# Patient Record
Sex: MALE | Race: WHITE
[De-identification: names, ages, dates, MRNs, and addresses within clinical notes are randomized per-mention and may not be internally consistent; named-entity substitution may affect disease eponyms.]

---

## 2018-06-18 ENCOUNTER — TRANSCRIPTION ENCOUNTER (OUTPATIENT)
Age: 48
End: 2018-06-18

## 2019-05-23 ENCOUNTER — APPOINTMENT (OUTPATIENT)
Dept: INTERNAL MEDICINE | Facility: CLINIC | Age: 49
End: 2019-05-23
Payer: COMMERCIAL

## 2019-05-23 VITALS
BODY MASS INDEX: 23.49 KG/M2 | TEMPERATURE: 98.3 F | HEIGHT: 65 IN | WEIGHT: 141 LBS | SYSTOLIC BLOOD PRESSURE: 126 MMHG | HEART RATE: 88 BPM | DIASTOLIC BLOOD PRESSURE: 80 MMHG

## 2019-05-23 DIAGNOSIS — Z00.00 ENCOUNTER FOR GENERAL ADULT MEDICAL EXAMINATION W/OUT ABNORMAL FINDINGS: ICD-10-CM

## 2019-05-23 DIAGNOSIS — F41.9 ANXIETY DISORDER, UNSPECIFIED: ICD-10-CM

## 2019-05-23 DIAGNOSIS — G47.00 INSOMNIA, UNSPECIFIED: ICD-10-CM

## 2019-05-23 DIAGNOSIS — R19.7 DIARRHEA, UNSPECIFIED: ICD-10-CM

## 2019-05-23 PROCEDURE — 36415 COLL VENOUS BLD VENIPUNCTURE: CPT

## 2019-05-23 PROCEDURE — 99213 OFFICE O/P EST LOW 20 MIN: CPT | Mod: 25

## 2019-05-23 RX ORDER — OMEPRAZOLE 40 MG/1
40 CAPSULE, DELAYED RELEASE ORAL
Qty: 90 | Refills: 0 | Status: ACTIVE | COMMUNITY
Start: 2018-12-05

## 2019-05-23 NOTE — HEALTH RISK ASSESSMENT
[Patient reported colonoscopy was normal] : Patient reported colonoscopy was normal [ColonoscopyDate] : 11/17 [ColonoscopyComments] : patrick

## 2019-05-23 NOTE — HISTORY OF PRESENT ILLNESS
[FreeTextEntry1] : insomnia--increased anxiety, loose stool [de-identified] : multiple somatic complaints--anxiety--poor sleep, loose bowels once day

## 2019-05-24 LAB
ALBUMIN SERPL ELPH-MCNC: 4.5 G/DL
ALP BLD-CCNC: 66 U/L
ALT SERPL-CCNC: 17 U/L
ANION GAP SERPL CALC-SCNC: 14 MMOL/L
AST SERPL-CCNC: 15 U/L
BASOPHILS # BLD AUTO: 0.05 K/UL
BASOPHILS NFR BLD AUTO: 0.6 %
BILIRUB SERPL-MCNC: 0.5 MG/DL
BUN SERPL-MCNC: 22 MG/DL
CALCIUM SERPL-MCNC: 9.8 MG/DL
CHLORIDE SERPL-SCNC: 103 MMOL/L
CO2 SERPL-SCNC: 28 MMOL/L
CREAT SERPL-MCNC: 1 MG/DL
EOSINOPHIL # BLD AUTO: 0.06 K/UL
EOSINOPHIL NFR BLD AUTO: 0.7 %
GLUCOSE SERPL-MCNC: 91 MG/DL
HCT VFR BLD CALC: 44.2 %
HGB BLD-MCNC: 14.3 G/DL
IMM GRANULOCYTES NFR BLD AUTO: 0.2 %
LYMPHOCYTES # BLD AUTO: 2.12 K/UL
LYMPHOCYTES NFR BLD AUTO: 25.2 %
MAN DIFF?: NORMAL
MCHC RBC-ENTMCNC: 29.5 PG
MCHC RBC-ENTMCNC: 32.4 GM/DL
MCV RBC AUTO: 91.3 FL
MONOCYTES # BLD AUTO: 0.48 K/UL
MONOCYTES NFR BLD AUTO: 5.7 %
NEUTROPHILS # BLD AUTO: 5.67 K/UL
NEUTROPHILS NFR BLD AUTO: 67.6 %
PLATELET # BLD AUTO: 345 K/UL
POTASSIUM SERPL-SCNC: 4.8 MMOL/L
PROT SERPL-MCNC: 6.7 G/DL
RBC # BLD: 4.84 M/UL
RBC # FLD: 12.1 %
SODIUM SERPL-SCNC: 145 MMOL/L
WBC # FLD AUTO: 8.4 K/UL

## 2020-10-01 ENCOUNTER — RECORD ABSTRACTING (OUTPATIENT)
Age: 50
End: 2020-10-01

## 2020-10-01 DIAGNOSIS — K12.1 OTHER FORMS OF STOMATITIS: ICD-10-CM

## 2020-10-01 DIAGNOSIS — Z86.69 PERSONAL HISTORY OF OTHER DISEASES OF THE NERVOUS SYSTEM AND SENSE ORGANS: ICD-10-CM

## 2020-10-01 DIAGNOSIS — R42 DIZZINESS AND GIDDINESS: ICD-10-CM

## 2020-10-01 DIAGNOSIS — H83.3X9 NOISE EFFECTS ON INNER EAR, UNSPECIFIED EAR: ICD-10-CM

## 2020-10-14 ENCOUNTER — APPOINTMENT (OUTPATIENT)
Dept: OTOLARYNGOLOGY | Facility: CLINIC | Age: 50
End: 2020-10-14
Payer: COMMERCIAL

## 2020-10-14 VITALS
TEMPERATURE: 97.4 F | HEART RATE: 65 BPM | HEIGHT: 65 IN | BODY MASS INDEX: 23.66 KG/M2 | SYSTOLIC BLOOD PRESSURE: 113 MMHG | WEIGHT: 142 LBS | DIASTOLIC BLOOD PRESSURE: 79 MMHG

## 2020-10-14 DIAGNOSIS — H61.23 IMPACTED CERUMEN, BILATERAL: ICD-10-CM

## 2020-10-14 DIAGNOSIS — K21.9 GASTRO-ESOPHAGEAL REFLUX DISEASE W/OUT ESOPHAGITIS: ICD-10-CM

## 2020-10-14 PROCEDURE — 92557 COMPREHENSIVE HEARING TEST: CPT

## 2020-10-14 PROCEDURE — 99213 OFFICE O/P EST LOW 20 MIN: CPT | Mod: 25

## 2020-10-14 PROCEDURE — 92567 TYMPANOMETRY: CPT

## 2020-10-14 RX ORDER — MV-MN/IRON/FA/K1/RESV/LUT/HERB 18 MG-240
TABLET ORAL
Refills: 0 | Status: ACTIVE | COMMUNITY

## 2020-10-14 NOTE — PHYSICAL EXAM
[Hearing Diamond Test (Tuning Fork On Forehead)] : no lateralization of tone [Normal] : orientation to person, place, and time: normal [FreeTextEntry8] : cerumen impaction removed via curettage [FreeTextEntry9] : cerumen impaction removed via curettage [FreeTextEntry2] : sinuses nontender to percussion [FreeTextEntry1] : uvula edematous [de-identified] : sensations intact

## 2020-10-14 NOTE — CONSULT LETTER
[Dear  ___] : Dear  [unfilled], [Courtesy Letter:] : I had the pleasure of seeing your patient, [unfilled], in my office today. [Please see my note below.] : Please see my note below. [Consult Closing:] : Thank you very much for allowing me to participate in the care of this patient.  If you have any questions, please do not hesitate to contact me. [Sincerely,] : Sincerely, [FreeTextEntry3] : Mendez Bazan MD FACS

## 2020-10-14 NOTE — ASSESSMENT
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 Audio. FU 6 months.

## 2020-10-14 NOTE — HISTORY OF PRESENT ILLNESS
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 Pt presents today for cerumen removal. Denies any change in hearing or tinnitus. He states that the left ear was bothering him 1 week ago but attributed it to the cerumen.

## 2020-10-14 NOTE — ADDENDUM
[FreeTextEntry1] : Documented by April Benitez acting as scribe for Dr. Bazan on 10/14/2020.\par \par All Medical record entries made by the Scribe were at my, Dr. Bazan, direction and personally dictated by me on 10/14/2020 . I have reviewed the chart and agree that the record accurately reflects my personal performance of the history, physical exam, assessment and plan. I have also personally directed, reviewed, and agreed with the discharge instructions.

## 2021-03-29 ENCOUNTER — APPOINTMENT (OUTPATIENT)
Dept: OTOLARYNGOLOGY | Facility: CLINIC | Age: 51
End: 2021-03-29
Payer: COMMERCIAL

## 2021-03-29 VITALS
DIASTOLIC BLOOD PRESSURE: 83 MMHG | HEART RATE: 69 BPM | WEIGHT: 140 LBS | SYSTOLIC BLOOD PRESSURE: 127 MMHG | HEIGHT: 65 IN | TEMPERATURE: 97.9 F | BODY MASS INDEX: 23.32 KG/M2

## 2021-03-29 DIAGNOSIS — H61.21 IMPACTED CERUMEN, RIGHT EAR: ICD-10-CM

## 2021-03-29 PROCEDURE — 99072 ADDL SUPL MATRL&STAF TM PHE: CPT

## 2021-03-29 PROCEDURE — 99213 OFFICE O/P EST LOW 20 MIN: CPT | Mod: 25

## 2021-03-29 PROCEDURE — 69210 REMOVE IMPACTED EAR WAX UNI: CPT

## 2021-03-29 NOTE — PHYSICAL EXAM
[Midline] : trachea is located in midline position [Clear / Open well] : hypopharynx is clear and opens well [Normal] : no rashes [FreeTextEntry8] : cerumen impaction removed via curettage  [FreeTextEntry9] : cerumen removed via curettage  [FreeTextEntry1] : elongated uvula [FreeTextEntry2] : sinuses nontender to percussion

## 2021-03-29 NOTE — HISTORY OF PRESENT ILLNESS
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 Pt presents today for cerumen removal. Denies a clogging sensation.

## 2021-03-29 NOTE — ASSESSMENT
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 FU 6 months.

## 2021-03-29 NOTE — ADDENDUM
[FreeTextEntry1] : Documented by April Benitez acting as scribe for Dr. Bazan on 03/29/2021.\par \par All Medical record entries made by the Scribe were at my, Dr. Bazan, direction and personally dictated by me on 03/29/2021 . I have reviewed the chart and agree that the record accurately reflects my personal performance of the history, physical exam, assessment and plan. I have also personally directed, reviewed, and agreed with the discharge instructions.

## 2021-08-17 ENCOUNTER — APPOINTMENT (OUTPATIENT)
Dept: OTOLARYNGOLOGY | Facility: CLINIC | Age: 51
End: 2021-08-17
Payer: COMMERCIAL

## 2021-08-17 VITALS
HEART RATE: 74 BPM | HEIGHT: 65 IN | BODY MASS INDEX: 23.32 KG/M2 | DIASTOLIC BLOOD PRESSURE: 92 MMHG | WEIGHT: 140 LBS | SYSTOLIC BLOOD PRESSURE: 129 MMHG

## 2021-08-17 DIAGNOSIS — H90.3 SENSORINEURAL HEARING LOSS, BILATERAL: ICD-10-CM

## 2021-08-17 PROCEDURE — 92557 COMPREHENSIVE HEARING TEST: CPT

## 2021-08-17 PROCEDURE — 92567 TYMPANOMETRY: CPT

## 2021-08-17 PROCEDURE — 99213 OFFICE O/P EST LOW 20 MIN: CPT

## 2021-08-17 NOTE — ADDENDUM
[FreeTextEntry1] : Documented by Frederic Liu acting as a scribe for Dr. Mendez Bazan on (08/17/2021).\par \par All medical record entries made by the Scribe were at my, Dr. Mendez Bazan's, direction and personally dictated by me on (08/17/2021). I have reviewed the chart and agree that the record accurately reflects my personal performance of the history, physical exam, assessment and plan. I have also personally directed, reviewed, and agree with the discharge instructions.\par \par

## 2021-08-17 NOTE — CONSULT LETTER
[Dear  ___] : Dear  [unfilled], [Courtesy Letter:] : I had the pleasure of seeing your patient, [unfilled], in my office today. [Please see my note below.] : Please see my note below. [Consult Closing:] : Thank you very much for allowing me to participate in the care of this patient.  If you have any questions, please do not hesitate to contact me. [Sincerely,] : Sincerely, [FreeTextEntry3] : Dr. Mendez Bazan MD FACS
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
## 2021-08-17 NOTE — DATA REVIEWED
[de-identified] : Tymps: Type A, AU\par Results obtained via insert earphones revealed:\par Right: hearing WNL with a mild notched SNHL 3-4khz\par Left: Hearing WNL with a mild notched SNHL 2-4khz \par *Slight Asymmetry noted at 2khz\par Recs: 1) Ent f/u 2) re-eval as per MD

## 2021-08-17 NOTE — ASSESSMENT
[FreeTextEntry1] : Reviewed and reconciled medications, allergies, PMHx, PSHx, SocHx, FMHx. 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 Right: hearing WNL with a mild notched SNHL 3-4khz. Left: Hearing WNL with a mild notched SNHL 2-4khz . *Slight Asymmetry noted at 2khz. No change from audio 10/2020\par \par Plan:\par Cerumen Removed. Audio - results interpreted by Dr. Bazan and reviewed with the patient. FU 6 months.

## 2021-08-17 NOTE — PHYSICAL EXAM
[Hearing Diamond Test (Tuning Fork On Forehead)] : no lateralization of tone [Normal] : lingual tonsils are normal [Midline] : trachea located in midline position [FreeTextEntry8] : Cerumen removed via curettage  [FreeTextEntry9] : Cerumen removed via curettage  [FreeTextEntry1] : deviated septum along floor- right greater than left [de-identified] : mild uvula edema [FreeTextEntry2] : Sinuses nontender to percussion.

## 2022-04-21 ENCOUNTER — APPOINTMENT (OUTPATIENT)
Dept: ORTHOPEDIC SURGERY | Facility: CLINIC | Age: 52
End: 2022-04-21
Payer: COMMERCIAL

## 2022-04-21 ENCOUNTER — APPOINTMENT (OUTPATIENT)
Dept: PEDIATRIC MEDICAL GENETICS | Facility: CLINIC | Age: 52
End: 2022-04-21
Payer: COMMERCIAL

## 2022-04-21 PROCEDURE — 96040: CPT | Mod: 95

## 2022-04-21 PROCEDURE — 73030 X-RAY EXAM OF SHOULDER: CPT | Mod: RT

## 2022-04-21 PROCEDURE — 99214 OFFICE O/P EST MOD 30 MIN: CPT

## 2022-04-23 NOTE — PHYSICAL EXAM
[Normal Coordination] : normal coordination [Normal DTR UE/LE] : normal DTR UE/LE  [Normal Sensation] : normal sensation [Normal Mood and Affect] : normal mood and affect [Orientated] : orientated [No Rash] : no rash [Normal Skin] : normal skin [No Ulcers] : no ulcers [No Lesions] : no lesions [No obvious lymphadenopathy in areas examined] : no obvious lymphadenopathy in areas examined [Right] : right shoulder [There are no fractures, subluxations or dislocations. No significant abnormalities are seen] : There are no fractures, subluxations or dislocations. No significant abnormalities are seen [Type 2 acromion] : Type 2 acromion [FreeTextEntry1] : OPAQUE FRAGMENTS IN THE DELTOID AND AXILLA

## 2022-04-23 NOTE — HISTORY OF PRESENT ILLNESS
[Right Arm] : right arm [Gradual] : gradual [8] : 8 [Sharp] : sharp [Stabbing] : stabbing [Exercising] : exercising [Intermittent] : intermittent [Lying in bed] : lying in bed [Full time] : Work status: full time [] : Post Surgical Visit: no [FreeTextEntry1] : right shoulder  [FreeTextEntry5] : Pt has had shoulder pain for about a month now. 

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
## 2022-06-02 ENCOUNTER — APPOINTMENT (OUTPATIENT)
Dept: ORTHOPEDIC SURGERY | Facility: CLINIC | Age: 52
End: 2022-06-02
Payer: COMMERCIAL

## 2022-06-02 VITALS — HEIGHT: 65 IN | BODY MASS INDEX: 23.32 KG/M2 | WEIGHT: 140 LBS

## 2022-06-02 PROCEDURE — 99214 OFFICE O/P EST MOD 30 MIN: CPT

## 2022-06-07 NOTE — DISCUSSION/SUMMARY
[de-identified] : Instructions: Dx / Natural History\par The patient was advised of the diagnosis.  The natural history of the pathology was explained in full to the patient in layman's terms.  Several different treatment options were discussed and explained in full to the patient including the risks and benefits of both surgical and non-surgical treatments.  All questions and concerns were answered. \par \par RICE\par I explained to the patient that rest, ice, compression, and elevation would benefit them.  They may return to activity after follow-up or when they no longer have any pain.\par \par NSAIDs - OTC\par Patient is to begin over the counter oral anti-inflammatory medications on an as needed basis, as long as there are no medical contraindications.  Patient is counseled on possible GI and blood pressure side effects.\par \par Pain Guide Activities\par The patient was advised to let pain guide the gradual advancement of activities.\par \par Icing\par The patient was advised to apply ice (wrapped in a towel or protective covering) to the area daily (20 minutes at a time, 2-4X/day).

## 2022-06-07 NOTE — PHYSICAL EXAM
[de-identified] : The patient is a well appearing 52 year year old male of their stated age.\par Neck is supple & nontender to palpation. Negative Spurling's test.\par \par Effected Shoulder \par Inspection:\par Scapula Winging: Negative\par Deformity: None\par Erythema: None\par Ecchymosis: None\par Abrasions: None\par Effusion: None\par \par Range of Motion:\par Active Forward Flexion: [160] degrees \par Passive Forward Flexion: [170] degrees \par Active IR : [l2] \par Passive ER : [45] degrees\par \par Motor Exam:\par Forward Flexion: 4+ out of 5\par Flexion Plane of Scapula: 5 out of 5\par Abduction: 4+ out of 5\par Internal Rotation: 5 out of 5\par External Rotation: 4+ out of 5\par Distal Motor Strength: 5 out of 5\par \par Stability Testing:\par Anterior: 1+\par Posterior: 1+\par Sulcus N: 1+\par Sulcus ER: 1+\par \par Provocative Tests:\par Drop Arm: Negative\par Blake/Impingement: Positive\par [place]: Positive\par X-Arm Adduction: Negative\par Belly Press: Negative\par Bear Hug: Negative\par Lift Off: Negative\par Apprehension: Negative\par Relocation: Negative\par Posterior Load & Shift: Negative\par \par Palpation:\par AC Joint: Nontender\par Clavicle: Nontender\par SC Joint: Nontender\par Bicepital Groove: Positive\par Coracoid Process: Nontender\par Pectoralis Minor Tendon: Nontender\par Pectoralis Major Tendon: Nontender & palpably intact\par Latissimus Dorsi: Nontender \par Proximal Humerus: Positive\par Scapula Body: Nontender\par Medial Scapula Boarder: Nontender\par Scapula Spine: Nontender\par \par Neurologic Exam: Sensation to Light Touch:\par Axillary: Grossly intact\par Ulnar: Grossly intact\par Radial: Grossly intact\par Median: Grossly intact\par Other: N/A\par \par Circulatory/Pulses:\par Ulnar: 2+\par Radial: 2+\par Other Pertinent Findings: None\par \par Contralateral Shoulder\par Range of Motion:\par Active Forward Flexion: 180 degrees \par Active Abduction: 180 degrees \par Passive Forward Flexion: 180 degrees \par Passive Abduction: 180 degrees \par ER @ 90 degrees: 90 degrees\par IR @ 90 degrees: 45 degrees\par ER @ 0 degrees: 50 degrees\par \par Motor Exam:\par Forward Flexion: 5 out of 5\par Flexion Plane of Scapula: 5 out of 5\par Abduction: 5 out of 5\par Internal Rotation: 5 out of 5\par External Rotation: 5 out of 5\par Distal Motor Strength: 5 out of 5\par \par Stability Testing:\par Anterior: 1+\par Posterior: 1+\par Sulcus N: 1+\par Sulcus ER: 1+\par \par Other Pertinent Findings: None

## 2022-06-07 NOTE — HISTORY OF PRESENT ILLNESS
[Right Arm] : right arm [Gradual] : gradual [8] : 8 [Sharp] : sharp [Stabbing] : stabbing [Intermittent] : intermittent [Exercising] : exercising [Lying in bed] : lying in bed [Full time] : Work status: full time [de-identified] : 06/02/2022 \par \isak CHANEL is presenting today for followup. Pain and symptoms are about the same. No real changes. He has been only doing stretching and exercises at home. He denies any numbness/tingling/fevers/chills.  [] : Post Surgical Visit: no [FreeTextEntry1] : right shoulder  [FreeTextEntry5] : Pt reported that here is no changes since his last visit. 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

## 2022-06-20 ENCOUNTER — APPOINTMENT (OUTPATIENT)
Dept: PEDIATRIC MEDICAL GENETICS | Facility: CLINIC | Age: 52
End: 2022-06-20

## 2022-06-20 PROCEDURE — 96040: CPT | Mod: 95

## 2022-08-01 ENCOUNTER — FORM ENCOUNTER (OUTPATIENT)
Age: 52
End: 2022-08-01

## 2022-08-02 ENCOUNTER — APPOINTMENT (OUTPATIENT)
Dept: MRI IMAGING | Facility: CLINIC | Age: 52
End: 2022-08-02

## 2022-08-02 ENCOUNTER — APPOINTMENT (OUTPATIENT)
Dept: ORTHOPEDIC SURGERY | Facility: CLINIC | Age: 52
End: 2022-08-02

## 2022-08-02 VITALS — BODY MASS INDEX: 23.32 KG/M2 | HEIGHT: 65 IN | WEIGHT: 140 LBS

## 2022-08-02 DIAGNOSIS — M76.891 OTHER SPECIFIED ENTHESOPATHIES OF RIGHT LOWER LIMB, EXCLUDING FOOT: ICD-10-CM

## 2022-08-02 DIAGNOSIS — M75.40 IMPINGEMENT SYNDROME OF UNSPECIFIED SHOULDER: ICD-10-CM

## 2022-08-02 DIAGNOSIS — M62.830 MUSCLE SPASM OF BACK: ICD-10-CM

## 2022-08-02 DIAGNOSIS — M54.50 LOW BACK PAIN, UNSPECIFIED: ICD-10-CM

## 2022-08-02 PROCEDURE — 99214 OFFICE O/P EST MOD 30 MIN: CPT

## 2022-08-02 PROCEDURE — 73221 MRI JOINT UPR EXTREM W/O DYE: CPT | Mod: RT

## 2022-08-02 NOTE — DISCUSSION/SUMMARY
[de-identified] : MRI R shoulder to evaluate RCT, he has not had relief with PT tx.\par F/U after MRI for review\par Discussed pain mgmt, possible spine injections for low back\par Pain guide activities\par Ice for tendinitis as directed\par

## 2022-08-02 NOTE — PHYSICAL EXAM
[Right] : right shoulder [Sitting] : sitting [Standing] : standing [Mild] : mild [4 ___] : forward flexion 4[unfilled]/5 [4___] : internal rotation 4[unfilled]/5 [Flexion] : flexion [Extension] : extension [de-identified] : The patient is a well appearing 52 year year old male of their stated age.\par Neck is supple & nontender to palpation. Negative Spurling's test.\par \par Effected Shoulder \par Inspection:\par Scapula Winging: Negative\par Deformity: None\par Erythema: None\par Ecchymosis: None\par Abrasions: None\par Effusion: None\par \par Range of Motion:\par Active Forward Flexion: [160] degrees \par Passive Forward Flexion: [170] degrees \par Active IR : [l2] \par Passive ER : [45] degrees\par \par Motor Exam:\par Forward Flexion: 4+ out of 5\par Flexion Plane of Scapula: 5 out of 5\par Abduction: 4+ out of 5\par Internal Rotation: 5 out of 5\par External Rotation: 4+ out of 5\par Distal Motor Strength: 5 out of 5\par \par Stability Testing:\par Anterior: 1+\par Posterior: 1+\par Sulcus N: 1+\par Sulcus ER: 1+\par \par Provocative Tests:\par Drop Arm: Negative\par Blake/Impingement: Positive\par [place]: Positive\par X-Arm Adduction: Negative\par Belly Press: Negative\par Bear Hug: Negative\par Lift Off: Negative\par Apprehension: Negative\par Relocation: Negative\par Posterior Load & Shift: Negative\par \par Palpation:\par AC Joint: Nontender\par Clavicle: Nontender\par SC Joint: Nontender\par Bicepital Groove: Positive\par Coracoid Process: Nontender\par Pectoralis Minor Tendon: Nontender\par Pectoralis Major Tendon: Nontender & palpably intact\par Latissimus Dorsi: Nontender \par Proximal Humerus: Positive\par Scapula Body: Nontender\par Medial Scapula Boarder: Nontender\par Scapula Spine: Nontender\par \par Neurologic Exam: Sensation to Light Touch:\par Axillary: Grossly intact\par Ulnar: Grossly intact\par Radial: Grossly intact\par Median: Grossly intact\par Other: N/A\par \par Circulatory/Pulses:\par Ulnar: 2+\par Radial: 2+\par Other Pertinent Findings: None\par \par Contralateral Shoulder\par Range of Motion:\par Active Forward Flexion: 180 degrees \par Active Abduction: 180 degrees \par Passive Forward Flexion: 180 degrees \par Passive Abduction: 180 degrees \par ER @ 90 degrees: 90 degrees\par IR @ 90 degrees: 45 degrees\par ER @ 0 degrees: 50 degrees\par \par Motor Exam:\par Forward Flexion: 5 out of 5\par Flexion Plane of Scapula: 5 out of 5\par Abduction: 5 out of 5\par Internal Rotation: 5 out of 5\par External Rotation: 5 out of 5\par Distal Motor Strength: 5 out of 5\par \par Stability Testing:\par Anterior: 1+\par Posterior: 1+\par Sulcus N: 1+\par Sulcus ER: 1+\par \par Other Pertinent Findings: None  [de-identified] : extension 10 degrees [] : no pain with abduction [TWNoteComboBox7] : active forward flexion 150 degrees [TWNoteComboBox6] : internal rotation L2 [de-identified] : external rotation 55 degrees [de-identified] : False

## 2022-08-02 NOTE — HISTORY OF PRESENT ILLNESS
[Sharp] : sharp [Throbbing] : throbbing [Tightness] : tightness [Sleep] : sleep [Right Arm] : right arm [Gradual] : gradual [8] : 8 [Stabbing] : stabbing [Intermittent] : intermittent [Exercising] : exercising [Lying in bed] : lying in bed [Full time] : Work status: full time [de-identified] : 8/2/22: 52 year old M RHD presents with continued RIGHT shoulder pain x 2 months. NO injury. Pain wakes him qhs. Was seen by Dr. Manley and sent for PT which he has been doing since June. No relief of pain and symptoms with PT tx. Naprosyn helps some. No n/t. \par \par He also c/o chronic low back pain. Radiates into right side - hip recently. He does not have groin pain. He tried lacrosse ball massage as instructed by physical therapist. No change in activity.  He states he lies prone and wakes with pain and stiffness.  No change in b/b function. Was seen by PMD (Dr. Martin) who sent him for CT Lumbar spine. He has been tripping frequently so he also saw a neurologist who ordered further studies. [] : Post Surgical Visit: no [FreeTextEntry1] : right shoulder  [FreeTextEntry5] : Pt reported that here is no changes since his last visit. 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

## 2022-08-05 ENCOUNTER — APPOINTMENT (OUTPATIENT)
Dept: ORTHOPEDIC SURGERY | Facility: CLINIC | Age: 52
End: 2022-08-05

## 2022-08-05 VITALS — WEIGHT: 140 LBS | HEIGHT: 65 IN | BODY MASS INDEX: 23.32 KG/M2

## 2022-08-05 DIAGNOSIS — S43.431D SUPERIOR GLENOID LABRUM LESION OF RIGHT SHOULDER, SUBSEQUENT ENCOUNTER: ICD-10-CM

## 2022-08-05 PROCEDURE — 99213 OFFICE O/P EST LOW 20 MIN: CPT

## 2022-08-05 NOTE — REASON FOR VISIT
[FreeTextEntry2] : 8/5/22- Had MRI: Impression: \par 1. SLAP tear with involvement of the biceps anchor. Biceps tenosynovitis.\par 2. Subscapularis tendinopathy and fraying.\par 3. 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 Full-thickness insertional tear of the supraspinatus and 18 mm retraction, proximal tendinopathy and fraying, \par and no muscle atrophy.\par 5. Infraspinatus tendinopathy and fraying with ill-defined low-grade tear within the fraying at the insertion. No\par muscle atrophy.

## 2022-08-05 NOTE — DISCUSSION/SUMMARY
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 Will have him see Dr. Burr for surgical consultation

## 2022-08-05 NOTE — HISTORY OF PRESENT ILLNESS
[Right Arm] : right arm [Gradual] : gradual [8] : 8 [Sharp] : sharp [Stabbing] : stabbing [Throbbing] : throbbing [Tightness] : tightness [Intermittent] : intermittent [Sleep] : sleep [Exercising] : exercising [Lying in bed] : lying in bed [Full time] : Work status: full time [de-identified] : 8/2/22: 52 year old M RHD presents with continued RIGHT shoulder pain x 2 months. NO injury. Pain wakes him qhs. Was seen by Dr. Manley and sent for PT which he has been doing since June. No relief of pain and symptoms with PT tx. Naprosyn helps some. No n/t. \par \par He also c/o chronic low back pain. Radiates into right side - hip recently. He does not have groin pain. He tried lacrosse ball massage as instructed by physical therapist. No change in activity.  He states he lies prone and wakes with pain and stiffness.  No change in b/b function. Was seen by PMD (Dr. Martin) who sent him for CT Lumbar spine. He has been tripping frequently so he also saw a neurologist who ordered further studies. [] : Post Surgical Visit: no [FreeTextEntry1] : right shoulder  [FreeTextEntry5] : Pt reported that here is no changes since his last visit. 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

## 2022-08-05 NOTE — PHYSICAL EXAM
[Flexion] : flexion [Extension] : extension [Right] : right shoulder [Sitting] : sitting [Standing] : standing [Mild] : mild [4 ___] : forward flexion 4[unfilled]/5 [4___] : internal rotation 4[unfilled]/5 [de-identified] : The patient is a well appearing 52 year year old male of their stated age.\par Neck is supple & nontender to palpation. Negative Spurling's test.\par \par Effected Shoulder \par Inspection:\par Scapula Winging: positive\par Deformity: None\par Erythema: None\par Ecchymosis: None\par Abrasions: None\par Effusion: None\par \par Range of Motion:\par Active Forward Flexion: [160] degrees \par Passive Forward Flexion: [170] degrees \par Active IR : [l2] \par Passive ER : [45] degrees\par \par Motor Exam:\par Forward Flexion: 4+ out of 5\par Flexion Plane of Scapula: 5 out of 5\par Abduction: 4+ out of 5\par Internal Rotation: 5 out of 5\par External Rotation: 4+ out of 5\par Distal Motor Strength: 5 out of 5\par \par Stability Testing:\par Anterior: 1+\par Posterior: 1+\par Sulcus N: 1+\par Sulcus ER: 1+\par \par Provocative Tests:\par Drop Arm: Negative\par Blake/Impingement: Positive\par [place]: Positive\par X-Arm Adduction: Negative\par Belly Press: Negative\par Bear Hug: Negative\par Lift Off: Negative\par Apprehension: Negative\par Relocation: Negative\par Posterior Load & Shift: Negative\par \par Palpation:\par AC Joint: Nontender\par Clavicle: Nontender\par SC Joint: Nontender\par Bicepital Groove: Positive\par Coracoid Process: Nontender\par Pectoralis Minor Tendon: Nontender\par Pectoralis Major Tendon: Nontender & palpably intact\par Latissimus Dorsi: Nontender \par Proximal Humerus: Positive\par Scapula Body: Nontender\par Medial Scapula Boarder: Nontender\par Scapula Spine: Nontender\par \par Neurologic Exam: Sensation to Light Touch:\par Axillary: Grossly intact\par Ulnar: Grossly intact\par Radial: Grossly intact\par Median: Grossly intact\par Other: N/A\par \par Circulatory/Pulses:\par Ulnar: 2+\par Radial: 2+\par Other Pertinent Findings: None\par \par Contralateral Shoulder\par Range of Motion:\par Active Forward Flexion: 180 degrees \par Active Abduction: 180 degrees \par Passive Forward Flexion: 180 degrees \par Passive Abduction: 180 degrees \par ER @ 90 degrees: 90 degrees\par IR @ 90 degrees: 45 degrees\par ER @ 0 degrees: 50 degrees\par \par Motor Exam:\par Forward Flexion: 5 out of 5\par Flexion Plane of Scapula: 5 out of 5\par Abduction: 5 out of 5\par Internal Rotation: 5 out of 5\par External Rotation: 5 out of 5\par Distal Motor Strength: 5 out of 5\par \par Stability Testing:\par Anterior: 1+\par Posterior: 1+\par Sulcus N: 1+\par Sulcus ER: 1+\par \par Other Pertinent Findings: None  [de-identified] : extension 10 degrees [] : no pain with abduction [TWNoteComboBox7] : active forward flexion 150 degrees [TWNoteComboBox6] : internal rotation L2 [de-identified] : external rotation 55 degrees

## 2022-08-11 ENCOUNTER — APPOINTMENT (OUTPATIENT)
Dept: ORTHOPEDIC SURGERY | Facility: CLINIC | Age: 52
End: 2022-08-11

## 2022-08-12 ENCOUNTER — APPOINTMENT (OUTPATIENT)
Dept: ORTHOPEDIC SURGERY | Facility: CLINIC | Age: 52
End: 2022-08-12

## 2022-08-12 VITALS — WEIGHT: 138 LBS | BODY MASS INDEX: 23.56 KG/M2 | HEIGHT: 64 IN

## 2022-08-12 PROCEDURE — 73030 X-RAY EXAM OF SHOULDER: CPT | Mod: RT

## 2022-08-12 PROCEDURE — 99214 OFFICE O/P EST MOD 30 MIN: CPT
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
## 2022-08-12 NOTE — DISCUSSION/SUMMARY
[de-identified] : Patient seen by Carloz Burr M.D.\par Entered by Myron Moe acting as scribe.\par \par Dr. Carloz Burr\par Shoulder Surgery\par

## 2022-08-12 NOTE — DATA REVIEWED
[MRI] : MRI [Right] : of the right [Shoulder] : shoulder [I independently reviewed and interpreted images and report] : I independently reviewed and interpreted images and report [FreeTextEntry1] : 8/2/22: There is a medium supraspinatus tear. There are some AC changes. The muscle seems decent. The biceps seems ok.

## 2022-08-12 NOTE — CONSULT LETTER
[Dear  ___] : Dear  [unfilled], [Consult Letter:] : I had the pleasure of evaluating your patient, [unfilled]. [Please see my note below.] : Please see my note below. [Consult Closing:] : Thank you very much for allowing me to participate in the care of this patient.  If you have any questions, please do not hesitate to contact me. [Sincerely,] : Sincerely, [FreeTextEntry3] : Dr. Carloz Burr\par Shoulder Surgery\par

## 2022-08-12 NOTE — IMAGING
[Right] : right shoulder [FreeTextEntry1] : The GH is intact. There is shrapnel noted.  [FreeTextEntry5] : There is type 1-2 acromion. There is AC widening.

## 2022-08-12 NOTE — ASSESSMENT
[FreeTextEntry1] : We discussed the underlying pathology. \par Treatment options reviewed. \par Start PT/HEP. \par His tenderness is greatest at the AC joint. \par Medrol is prescribed. \par He lives alone, and has had recent family loss. \par Surgery is an option. \par Cautions discussed. \par Questions answered.\par
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
## 2022-08-12 NOTE — PHYSICAL EXAM
[Orientated] : orientated [Able to Communicate] : able to communicate [Normal Skin] : normal skin [Well Developed] : well developed [Right] : right shoulder [Moderate] : moderate [5 ___] : forward flexion 5[unfilled]/5 [5___] : external rotation 5[unfilled]/5 [Left] : left shoulder [Sitting] : sitting [Mild] : mild [] : no sensory deficits [FreeTextEntry8] : Minimal Greater tiberosity tenderness.  [TWNoteComboBox6] : internal rotation L1 [de-identified] : external rotation 50 degrees [TWNoteComboBox4] : False

## 2022-08-29 ENCOUNTER — APPOINTMENT (OUTPATIENT)
Dept: OTOLARYNGOLOGY | Facility: CLINIC | Age: 52
End: 2022-08-29

## 2022-08-29 ENCOUNTER — APPOINTMENT (OUTPATIENT)
Dept: ORTHOPEDIC SURGERY | Facility: CLINIC | Age: 52
End: 2022-08-29

## 2022-08-29 VITALS — WEIGHT: 140 LBS | BODY MASS INDEX: 23.9 KG/M2 | HEIGHT: 64 IN

## 2022-08-29 VITALS
BODY MASS INDEX: 24.75 KG/M2 | DIASTOLIC BLOOD PRESSURE: 85 MMHG | HEIGHT: 64 IN | WEIGHT: 145 LBS | HEART RATE: 65 BPM | SYSTOLIC BLOOD PRESSURE: 127 MMHG

## 2022-08-29 DIAGNOSIS — J32.9 CHRONIC SINUSITIS, UNSPECIFIED: ICD-10-CM

## 2022-08-29 DIAGNOSIS — Z87.09 PERSONAL HISTORY OF OTHER DISEASES OF THE RESPIRATORY SYSTEM: ICD-10-CM

## 2022-08-29 DIAGNOSIS — H61.22 IMPACTED CERUMEN, LEFT EAR: ICD-10-CM

## 2022-08-29 PROCEDURE — 99214 OFFICE O/P EST MOD 30 MIN: CPT | Mod: 25

## 2022-08-29 PROCEDURE — 92567 TYMPANOMETRY: CPT

## 2022-08-29 PROCEDURE — 92557 COMPREHENSIVE HEARING TEST: CPT

## 2022-08-29 PROCEDURE — 99214 OFFICE O/P EST MOD 30 MIN: CPT

## 2022-08-29 NOTE — HISTORY OF PRESENT ILLNESS
[10] : 10 [4] : 4 [de-identified] : pt is here today for a follow up for his right shoulder. pt states his pain is the same as last visit  [FreeTextEntry1] : right shoulder  [de-identified] : home exercises, was only able to go to physical therapy one time

## 2022-08-29 NOTE — DATA REVIEWED
[de-identified] : type A tymps AU\par AD: hearing -8000 Hz with a mild SNHL 3-4kHz\par AS: hearing -8000 Hz with a mild SNHL 2-4kHz

## 2022-08-29 NOTE — PHYSICAL EXAM
[Orientated] : orientated [Able to Communicate] : able to communicate [Normal Skin] : normal skin [Well Developed] : well developed [Right] : right shoulder [Moderate] : moderate [5 ___] : forward flexion 5[unfilled]/5 [5___] : external rotation 5[unfilled]/5 [Left] : left shoulder [Sitting] : sitting [Mild] : mild [] : no sensory deficits [FreeTextEntry3] : There is a posterior scar along the medial scapular border. [TWNoteComboBox4] : passive forward flexion 160 degrees [TWNoteComboBox6] : internal rotation L1 [de-identified] : external rotation 50 degrees
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
## 2022-08-29 NOTE — DATA REVIEWED
[MRI] : MRI [Right] : of the right [Shoulder] : shoulder [I independently reviewed and interpreted images and report] : I independently reviewed and interpreted images and report [FreeTextEntry1] : 8/2/22: There is a medium supraspinatus tear. There are some AC changes. The muscle seems decent. The biceps seems ok. \par \par X-rays of the right shoulder is as follows: \par Shoulder Comments: The GH is intact. There is shrapnel noted.\par Scapula Comments: There is type 1-2 acromion. There is AC widening.

## 2022-08-29 NOTE — ASSESSMENT
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 Pt denies any worsening of his hearing loss or tinnitus. Had MRI of brain done with neurologist due to tripping on his feet. \par \par July 15, 2022 MRI brain with contrast:\par -cyst in left maxillary sinus\par -everything else was fine\par \par July 15, 2022: CT head:\par -paranasal sinuses demonstrated mucosal thickening and retention cyst formation particularly of the left maxillary sinus.\par \par Audio 2021:\par -slopping loss Right ear acoustic notch 4k\par -left ear slopping loss with acoustic notch at 2k\par \par Physical Exam:\par -Right Ear: cerumen removed via curettage \par -Left Ear: cerumen impaction removed via curettage \par -enlongated uvula\par -type 3 oral cavity\par -deviated septum to the right\par \par Audio: no change. \par type A tymps AU\par AD: hearing -8000 Hz with a mild SNHL 3-4kHz\par AS: hearing -8000 Hz with a mild SNHL 2-4kHz\par \par plan: Cerumen removed. Audio - results interpreted by Dr. Bazan and reviewed with the patient. FU 6 months

## 2022-08-29 NOTE — ASSESSMENT
[FreeTextEntry1] : We discussed his issues.\par He needs to insure home care for a post-op period.\par He is inquiring with his insurer.\par He was planning to take a taxi home.\par He needs to have simples ADLs covered.\par He will confirm home PT.\par His questions answered.\par He will cont PT now.

## 2022-08-29 NOTE — PHYSICAL EXAM
[] : septum deviated to the right [Midline] : trachea located in midline position [Normal] : no rashes [FreeTextEntry8] : cerumen removed via curettage  [FreeTextEntry9] : cerumen impaction removed via curettage  [de-identified] : enlongated emelyn  [de-identified] : type 3 oral cavity [FreeTextEntry2] : sensations intact. sinuses nontender to percussion

## 2022-08-29 NOTE — HISTORY OF PRESENT ILLNESS
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 Patient states he was at neurologist where he was told he should have sinuses checked after having an MRI on his head. Patient states he had the scan done because he has been starting to trip on his own feet. \par \par

## 2022-08-29 NOTE — CONSULT LETTER
Pt presents to birthing center with complaints of pain. To LT03. Instructed to change into gown and obtain CCMSUA. [Dear  ___] : Dear  [unfilled], [Courtesy Letter:] : I had the pleasure of seeing your patient, [unfilled], in my office today. [Please see my note below.] : Please see my note below. [Consult Closing:] : Thank you very much for allowing me to participate in the care of this patient.  If you have any questions, please do not hesitate to contact me. [Sincerely,] : Sincerely, [FreeTextEntry1] : Mendez Bazan MD FACS

## 2022-08-31 ENCOUNTER — APPOINTMENT (OUTPATIENT)
Dept: ORTHOPEDIC SURGERY | Facility: CLINIC | Age: 52
End: 2022-08-31

## 2022-08-31 VITALS — HEIGHT: 64 IN | WEIGHT: 140 LBS | BODY MASS INDEX: 23.9 KG/M2

## 2022-08-31 PROCEDURE — 99214 OFFICE O/P EST MOD 30 MIN: CPT
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
## 2022-08-31 NOTE — PHYSICAL EXAM
[Right] : right shoulder [Moderate] : moderate [5 ___] : forward flexion 5[unfilled]/5 [5___] : external rotation 5[unfilled]/5 [Left] : left shoulder [Sitting] : sitting [Mild] : mild [Orientated] : orientated [Able to Communicate] : able to communicate [Normal Skin] : normal skin [Well Developed] : well developed [] : no sensory deficits [FreeTextEntry3] : There is a posterior scar along the medial scapular border. [TWNoteComboBox4] : passive forward flexion 160 degrees [TWNoteComboBox6] : internal rotation L1 [de-identified] : external rotation 50 degrees

## 2022-08-31 NOTE — HISTORY OF PRESENT ILLNESS
[10] : 10 [5] : 5 [Sharp] : sharp [Frequent] : frequent [Household chores] : household chores [Leisure] : leisure [Sleep] : sleep [de-identified] : Patient is here for a follow up on his right shoulder. [] : no [FreeTextEntry1] : right shoulder [de-identified] : lifting [de-identified] : HEP

## 2022-08-31 NOTE — DATA REVIEWED
[FreeTextEntry1] : MRI 8/2/22: There is a medium supraspinatus tear. There are some AC changes. The muscle seems decent. The biceps seems ok. \par \par X-rays of the right shoulder is as follows: \par Shoulder Comments: The GH is intact. There is shrapnel noted.\par Scapula Comments: There is type I -II acromion. There is AC widening.

## 2022-08-31 NOTE — CONSULT LETTER
[Dear  ___] : Dear  [unfilled], [Consult Letter:] : I had the pleasure of evaluating your patient, [unfilled]. [Please see my note below.] : Please see my note below. [Sincerely,] : Sincerely, [FreeTextEntry3] : Carloz Valverde M.D.\par Shoulder Surgery\par

## 2022-08-31 NOTE — ASSESSMENT
[FreeTextEntry1] : .\par We discussed treatment options, both non-operative and operative, further.  I do think he is a candidate for surgery.  Pain relief is a goal as well as improving function and motion.  I reviewed surgical techniques pictorially in the books that I co-edited.\par \par Interscalene anesthesia, general anesthesia and postoperative pain management were discussed.  The importance of physical therapy postoperatively, the gradual recovery and the rehabilitation program with initial driving restrictions were noted.  The use of a Cryo-Cuff by [employer] and a sling for functional recovery was reviewed.  He understands there are no guarantees.  The benefits of decreased pain, increased function and restoring anatomy were outlined.  The risks were reviewed including, but not limited to, infection, failure, bleeding, stiffness, pain, clotting, fracture, re-tear, hardware failure, deformity, functional limitation, scarring, neurovascular compromise, and narcotic use issues.  Under certain circumstances we discussed, further surgery may be indicated.\par \par He understands that 100% recovery is not expected, and the desired level of function may not be achievable.  The complicated nature of his condition, including the tear pattern and associated issues, was noted.  We discussed the potential for a prolonged recovery course and the potential for this to affect his activities, which could include a work regimen.  His questions were answered.  Other opinions can be pursued, as we discussed.\par \par He does wish to proceed with surgery.  This would include a right shoulder arthroscopy, debridement, synovectomy, decompression, medium cuff repair, distal clavicle resection, possible biceps tenodesis.  We will schedule this at the earliest mutual convenient time.\par \par We discussed his issues, including the effect of his scapula.  He will be in the post-op sling for 6 weeks, and understands the RUE will not be usable for 6-8 weeks post-op for his ADLs.  This especially includes lifting, pushing, pulling, carrying, which could be limited for months.  He has insurance benefits home care for a post-op period.  He will clarify if he can have this s/p RCR.  He has friends who can assist and take him home from surgery.  He did confirm home PT.  He will start PT now.  Medical clearance is planned.\par \par Patient seen by Carloz Burr M.D.\par Entered by Shanna Mclaughlin acting as scribe.

## 2022-09-23 ENCOUNTER — APPOINTMENT (OUTPATIENT)
Dept: ORTHOPEDIC SURGERY | Facility: CLINIC | Age: 52
End: 2022-09-23

## 2022-09-23 VITALS — WEIGHT: 140 LBS | BODY MASS INDEX: 23.32 KG/M2 | HEIGHT: 65 IN

## 2022-09-23 DIAGNOSIS — S43.394A: ICD-10-CM

## 2022-09-23 PROCEDURE — 99214 OFFICE O/P EST MOD 30 MIN: CPT

## 2022-09-23 NOTE — PHYSICAL EXAM
[Orientated] : orientated [Able to Communicate] : able to communicate [Normal Skin] : normal skin [Well Developed] : well developed [Right] : right shoulder [Moderate] : moderate [5 ___] : forward flexion 5[unfilled]/5 [5___] : external rotation 5[unfilled]/5 [Left] : left shoulder [Sitting] : sitting [Mild] : mild [] : no sensory deficits [FreeTextEntry3] : There is a posterior scar along the medial scapular border. [TWNoteComboBox4] : passive forward flexion 160 degrees [TWNoteComboBox6] : internal rotation L1 [de-identified] : external rotation 50 degrees

## 2022-09-23 NOTE — HISTORY OF PRESENT ILLNESS
[8] : 8 [6] : 6 [de-identified] : pt is here today for a follow up for his right shoulder. pt states his pain is similar to last visit, has surgery scheduled for October  [FreeTextEntry1] : right shoulder  [de-identified] : physical therapy
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
## 2022-09-23 NOTE — ASSESSMENT
[FreeTextEntry1] : .\par He still does wish to proceed with surgery.  This would include a right shoulder arthroscopy, debridement, synovectomy, decompression, medium cuff repair, distal clavicle resection, possible biceps tenodesis.  We will schedule this at the earliest mutual convenient time.\par \par We discussed his issues, including the effect of his scapula.  He will be in the post-op sling for 6 weeks, and understands the RUE will not be usable for 6-8 weeks post-op for his ADLs.  This especially includes lifting, pushing, pulling, carrying, which could be limited for months.  He has insurance benefits home care for a post-op period.  He will clarify if he can have this s/p RCR.  He has friends who can assist and take him home from surgery.  He did confirm home PT.  He did start PT.  Medical clearance is planned.\par \par He brought up post-op in patient care.  This is not something I have done or considered for other patients.  He has arranged for a HHA post-op at this home.  There is no overnight stay at the ASC.  Stopping motrin 10 days prior to surgery reviewed.  Stopping supplements also noted..  His numerous questions were answered.

## 2022-10-12 ENCOUNTER — APPOINTMENT (OUTPATIENT)
Dept: ORTHOPEDIC SURGERY | Facility: CLINIC | Age: 52
End: 2022-10-12

## 2022-10-12 DIAGNOSIS — M25.511 PAIN IN RIGHT SHOULDER: ICD-10-CM

## 2022-10-12 PROCEDURE — L3670: CPT | Mod: RT

## 2022-10-16 ENCOUNTER — NON-APPOINTMENT (OUTPATIENT)
Age: 52
End: 2022-10-16

## 2022-10-18 ENCOUNTER — APPOINTMENT (OUTPATIENT)
Age: 52
End: 2022-10-18

## 2022-10-18 ENCOUNTER — NON-APPOINTMENT (OUTPATIENT)
Age: 52
End: 2022-10-18

## 2022-10-18 PROCEDURE — 29823 SHO ARTHRS SRG XTNSV DBRDMT: CPT | Mod: AS,59,RT

## 2022-10-18 PROCEDURE — 29824 SHO ARTHRS SRG DSTL CLAVICLC: CPT | Mod: AS,59,RT

## 2022-10-18 PROCEDURE — 29826 SHO ARTHRS SRG DECOMPRESSION: CPT | Mod: RT

## 2022-10-18 PROCEDURE — 29827 SHO ARTHRS SRG RT8TR CUF RPR: CPT | Mod: RT

## 2022-10-18 PROCEDURE — 29823 SHO ARTHRS SRG XTNSV DBRDMT: CPT | Mod: 59,RT

## 2022-10-18 PROCEDURE — 29826 SHO ARTHRS SRG DECOMPRESSION: CPT | Mod: AS,RT

## 2022-10-18 PROCEDURE — 29827 SHO ARTHRS SRG RT8TR CUF RPR: CPT | Mod: AS,RT

## 2022-10-18 PROCEDURE — 29824 SHO ARTHRS SRG DSTL CLAVICLC: CPT | Mod: 59,RT

## 2022-10-23 ENCOUNTER — FORM ENCOUNTER (OUTPATIENT)
Age: 52
End: 2022-10-23

## 2022-10-28 ENCOUNTER — APPOINTMENT (OUTPATIENT)
Dept: ORTHOPEDIC SURGERY | Facility: CLINIC | Age: 52
End: 2022-10-28

## 2022-10-31 ENCOUNTER — APPOINTMENT (OUTPATIENT)
Dept: ORTHOPEDIC SURGERY | Facility: CLINIC | Age: 52
End: 2022-10-31

## 2022-10-31 VITALS — WEIGHT: 135 LBS | BODY MASS INDEX: 22.49 KG/M2 | HEIGHT: 65 IN

## 2022-10-31 DIAGNOSIS — M75.41 IMPINGEMENT SYNDROME OF RIGHT SHOULDER: ICD-10-CM

## 2022-10-31 PROCEDURE — 99024 POSTOP FOLLOW-UP VISIT: CPT

## 2022-10-31 PROCEDURE — 73030 X-RAY EXAM OF SHOULDER: CPT | Mod: RT

## 2022-10-31 NOTE — HISTORY OF PRESENT ILLNESS
[] : Post Surgical Visit: yes [de-identified] : pt is here today for his first post operative visit for the right shoulder, doing well [de-identified] : 10/18/2022 [de-identified] : right shoulder arthroscopy

## 2022-10-31 NOTE — ASSESSMENT
[FreeTextEntry1] : We reviewed the scope pictures.\par The sling use was outlined.\par PT will begin.\par His questions were answered.

## 2022-10-31 NOTE — PHYSICAL EXAM
[Right] : right shoulder [] : no sensory deficits [FreeTextEntry9] : ROM was not assessed. [de-identified] : Strength was not assessed.

## 2022-10-31 NOTE — REASON FOR VISIT
[FreeTextEntry2] : This is a 52 year old RHD male  with right shoulder pain since may 2022 without injury. \par \par DOS: 10/18/2022\par Procedure: Right Shoulder Arthroscopy, Glenohumeral Debridement, Subacromial Decompression Medium Rotator Cuff Repair (DR), Distal Clavicle Resection \par Diagnosis: Subacromial Impingement, Medium Rotator Cuff Tear, AC Arthralgia, Shoulder Pain, Glenohumeral Synovitis, Glenohumeral Chondrosis, SLAP Tear, Partial Anterior Labral Tear, Partial Posterior Labral Tear\par \par No f/c/s or PT.  He wears his sling. He is doing OK.  His HHA is here.

## 2022-11-07 ENCOUNTER — FORM ENCOUNTER (OUTPATIENT)
Age: 52
End: 2022-11-07

## 2022-11-19 ENCOUNTER — APPOINTMENT (OUTPATIENT)
Dept: ORTHOPEDIC SURGERY | Facility: CLINIC | Age: 52
End: 2022-11-19
Payer: COMMERCIAL

## 2022-11-19 DIAGNOSIS — M47.816 SPONDYLOSIS W/OUT MYELOPATHY OR RADICULOPATHY, LUMBAR REGION: ICD-10-CM

## 2022-11-19 PROCEDURE — 99214 OFFICE O/P EST MOD 30 MIN: CPT | Mod: 24

## 2022-11-19 PROCEDURE — 72110 X-RAY EXAM L-2 SPINE 4/>VWS: CPT

## 2022-11-19 PROCEDURE — 72170 X-RAY EXAM OF PELVIS: CPT

## 2022-11-19 NOTE — DISCUSSION/SUMMARY
[de-identified] : reviewed the case/xrays/mri with him - Lumbar degenerative changes with L5-S1 disc hernatiion noted on the older MRi from 2019 \par indicated for updated MRi L spine \par fu to review the MRi

## 2022-11-19 NOTE — CONSULT LETTER
[Dear  ___] : Dear  [unfilled], [Consult Letter:] : I had the pleasure of evaluating your patient, [unfilled]. [Consult Closing:] : Thank you very much for allowing me to participate in the care of this patient.  If you have any questions, please do not hesitate to contact me. [FreeTextEntry1] : thanks for referral will get lumbar MRI

## 2022-11-19 NOTE — HISTORY OF PRESENT ILLNESS
[Lower back] : lower back [Gradual] : gradual [Sudden] : sudden [6] : 6 [5] : 5 [Burning] : burning [Shooting] : shooting [Stabbing] : stabbing [Intermittent] : intermittent [Rest] : rest [Exercising] : exercising [de-identified] : 11/19/22:  51 yo M with a weak lower back for years - lower back pain - not shooting down the legs - NC from Dr Burr \par \par No prior surgery \par Has had PT \par \par No hx of cancer\par no loss of bb control \par \par teacher \par \par xrays today:\par l spine - grade 1 spondylolisthesis L5-S1, loss of disc hieght L4-S1 \par AP PELVIS - negative \par \par MRi L spine [facility] 2019 - disc herniation at L5-S1 \par \par GERD - has lower bone density - sees endocrinology and is on calcium\par had shoulder surgery on the rigth side with Dr Burr on 10/18 [] : Post Surgical Visit: no [FreeTextEntry5] : Pt has had a gradual onset of L spine pain for the past few years, pt has a ct scan from 8/2022 that shows some spinal stenosis and DDD sd well as some disc herneations  [FreeTextEntry1] : L spine  [de-identified] : none

## 2022-11-22 ENCOUNTER — APPOINTMENT (OUTPATIENT)
Dept: MRI IMAGING | Facility: CLINIC | Age: 52
End: 2022-11-22

## 2022-12-12 ENCOUNTER — APPOINTMENT (OUTPATIENT)
Dept: ORTHOPEDIC SURGERY | Facility: CLINIC | Age: 52
End: 2022-12-12

## 2022-12-12 PROCEDURE — 99024 POSTOP FOLLOW-UP VISIT: CPT

## 2022-12-12 NOTE — PHYSICAL EXAM
[Right] : right shoulder [Supine] : supine [Moderate] : moderate [] : no sensory deficits [de-identified] : Strength was not assessed. [de-identified] : +ARC [TWNoteComboBox4] : passive forward flexion 140 degrees [de-identified] : external rotation 30 degrees
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
## 2022-12-12 NOTE — ASSESSMENT
[FreeTextEntry1] : He is doing OK.\par Encouragement given.\par Pain meds renewed.\par Mobic prescribed.\par PT will continue.\par Activities discussed.\par Questions answered.\par \par Entered by Suzette YBARRA acting as a scribe for Dr. Alexx Burr.\par Patient see by Suzette YBARRA under the supervision of Dr. Alexx Burr.\par

## 2022-12-14 ENCOUNTER — APPOINTMENT (OUTPATIENT)
Dept: ORTHOPEDIC SURGERY | Facility: CLINIC | Age: 52
End: 2022-12-14

## 2022-12-19 ENCOUNTER — OFFICE (OUTPATIENT)
Dept: URBAN - METROPOLITAN AREA CLINIC 109 | Facility: CLINIC | Age: 52
Setting detail: OPHTHALMOLOGY
End: 2022-12-19
Payer: COMMERCIAL

## 2022-12-19 DIAGNOSIS — H40.033: ICD-10-CM

## 2022-12-19 PROCEDURE — 99213 OFFICE O/P EST LOW 20 MIN: CPT | Performed by: OPHTHALMOLOGY

## 2022-12-19 PROCEDURE — 92020 GONIOSCOPY: CPT | Performed by: OPHTHALMOLOGY

## 2022-12-19 ASSESSMENT — REFRACTION_CURRENTRX
OD_SPHERE: +0.50
OS_OVR_VA: 20/
OS_SPHERE: +0.50
OD_SPHERE: +0.50
OD_OVR_VA: 20/
OD_ADD: +1.50
OS_OVR_VA: 20/
OD_ADD: +0.50
OS_SPHERE: +0.50
OD_OVR_VA: 20/
OS_ADD: +1.50
OD_VPRISM_DIRECTION: PROGS
OS_VPRISM_DIRECTION: PROGS
OS_ADD: +0.50

## 2022-12-19 ASSESSMENT — TONOMETRY
OD_IOP_MMHG: 16
OS_IOP_MMHG: 16

## 2022-12-19 ASSESSMENT — REFRACTION_AUTOREFRACTION
OS_AXIS: 57
OD_CYLINDER: -0.25
OD_SPHERE: +0.50
OD_AXIS: 129
OS_SPHERE: +0.75
OS_CYLINDER: -0.75

## 2022-12-19 ASSESSMENT — REFRACTION_MANIFEST
OD_SPHERE: +0.50
OS_ADD: +0.50
OD_ADD: +0.50
OS_SPHERE: +0.50

## 2022-12-19 ASSESSMENT — CONFRONTATIONAL VISUAL FIELD TEST (CVF)
OS_FINDINGS: FULL
OD_FINDINGS: FULL

## 2022-12-19 ASSESSMENT — VISUAL ACUITY
OS_BCVA: 20/25
OD_BCVA: 20/20-1

## 2022-12-19 ASSESSMENT — SPHEQUIV_DERIVED
OS_SPHEQUIV: 0.375
OD_SPHEQUIV: 0.375

## 2022-12-19 ASSESSMENT — SUPERFICIAL PUNCTATE KERATITIS (SPK)
OD_SPK: 1+
OS_SPK: 1+

## 2022-12-19 ASSESSMENT — CORNEAL DYSTROPHY - POSTERIOR
OS_POSTERIORDYSTROPHY: GUTTATA
OD_POSTERIORDYSTROPHY: GUTTATA

## 2023-01-30 ENCOUNTER — APPOINTMENT (OUTPATIENT)
Dept: ORTHOPEDIC SURGERY | Facility: CLINIC | Age: 53
End: 2023-01-30
Payer: COMMERCIAL

## 2023-01-30 VITALS — HEIGHT: 65 IN | BODY MASS INDEX: 22.99 KG/M2 | WEIGHT: 138 LBS

## 2023-01-30 PROCEDURE — 99214 OFFICE O/P EST MOD 30 MIN: CPT

## 2023-01-30 NOTE — PHYSICAL EXAM
[Right] : right shoulder [Moderate] : moderate [Sitting] : sitting [4 ___] : forward flexion 4[unfilled]/5 [4___] : external rotation 4[unfilled]/5 [] : no sensory deficits [de-identified] : There is a positive arc of pain.  [TWNoteComboBox4] : passive forward flexion 140 degrees [TWNoteComboBox6] : internal rotation L2 [de-identified] : external rotation 45 degrees

## 2023-01-30 NOTE — HISTORY OF PRESENT ILLNESS
[7] : 7 [5] : 5 [de-identified] : pt is here today for a follow up for his right shoulder. pt states his pain has gotten worse since last visit, still having weakness and limited range of motion. pt states his healthcare aid stopped yesterday  [FreeTextEntry1] : right shoulder  [de-identified] : physical therapy \par meloxicam used as needed

## 2023-01-30 NOTE — ASSESSMENT
[FreeTextEntry1] : We discussed his course. \par MDP is prescribed. \par Aleve use discussed. \par PT is prescribed. \par Questions answered. \par \par Patient seen by Carloz Burr M.D.\par Entered by Shanna Mclaughlin acting as scribe.

## 2023-02-01 ENCOUNTER — FORM ENCOUNTER (OUTPATIENT)
Age: 53
End: 2023-02-01

## 2023-02-24 ENCOUNTER — APPOINTMENT (OUTPATIENT)
Dept: OTOLARYNGOLOGY | Facility: CLINIC | Age: 53
End: 2023-02-24
Payer: COMMERCIAL

## 2023-02-24 ENCOUNTER — NON-APPOINTMENT (OUTPATIENT)
Age: 53
End: 2023-02-24

## 2023-02-24 VITALS
HEART RATE: 82 BPM | HEIGHT: 65 IN | WEIGHT: 138 LBS | DIASTOLIC BLOOD PRESSURE: 85 MMHG | BODY MASS INDEX: 22.99 KG/M2 | SYSTOLIC BLOOD PRESSURE: 119 MMHG

## 2023-02-24 PROCEDURE — 99213 OFFICE O/P EST LOW 20 MIN: CPT

## 2023-02-24 NOTE — HISTORY OF PRESENT ILLNESS
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 Patient states everything has been good and stable. Denies change in hearing. He mentions the neurologist didn’t find anything.

## 2023-02-24 NOTE — PHYSICAL EXAM
[Hearing Loss Right Only] : diminished [Hearing Loss Left Only] : diminished [Hearing Diamond Test (Tuning Fork On Forehead)] : no lateralization of tone [] : septum deviated to the right [Midline] : trachea located in midline position [Normal] : no rashes [FreeTextEntry8] : cerumen removed via curettage  [FreeTextEntry9] : cerumen removed via curettage  [de-identified] : a little dry [de-identified] : leukoplakia on the cheeks [FreeTextEntry2] : sensations intact. sinuses nontender to percussion  [de-identified] : JAGJIT
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
## 2023-02-24 NOTE — CONSULT LETTER
[Dear  ___] : Dear  [unfilled], [Courtesy Letter:] : I had the pleasure of seeing your patient, [unfilled], in my office today. [Please see my note below.] : Please see my note below. [Consult Closing:] : Thank you very much for allowing me to participate in the care of this patient.  If you have any questions, please do not hesitate to contact me. [Sincerely,] : Sincerely, [FreeTextEntry1] : Mendez Bazan MD FACS

## 2023-03-01 ENCOUNTER — FORM ENCOUNTER (OUTPATIENT)
Age: 53
End: 2023-03-01

## 2023-03-08 ENCOUNTER — FORM ENCOUNTER (OUTPATIENT)
Age: 53
End: 2023-03-08

## 2023-04-12 ENCOUNTER — APPOINTMENT (OUTPATIENT)
Dept: ORTHOPEDIC SURGERY | Facility: CLINIC | Age: 53
End: 2023-04-12
Payer: COMMERCIAL

## 2023-04-12 VITALS — BODY MASS INDEX: 22.82 KG/M2 | WEIGHT: 137 LBS | HEIGHT: 65 IN

## 2023-04-12 DIAGNOSIS — M25.511 PAIN IN RIGHT SHOULDER: ICD-10-CM

## 2023-04-12 DIAGNOSIS — M75.121 COMPLETE ROTATOR CUFF TEAR OR RUPTURE OF RIGHT SHOULDER, NOT SPECIFIED AS TRAUMATIC: ICD-10-CM

## 2023-04-12 PROCEDURE — 99214 OFFICE O/P EST MOD 30 MIN: CPT

## 2023-04-12 NOTE — ASSESSMENT
[FreeTextEntry1] : We again discussed his course. \par He has continued to make gains. \par More gains are expected. \par PT is renewed. \par He will continue HEP. \par Cautions discussed.\par Questions answered. \par \par Patient seen by Carloz Burr M.D.\par Entered by Shanna Mclaughlin acting as scribe.

## 2023-04-12 NOTE — REASON FOR VISIT
[FreeTextEntry2] : This is a 53 year old RHD male  with right shoulder pain since may 2022 without injury. \par \par DOS: 10/18/2022\par Procedure: Right Shoulder Arthroscopy, Glenohumeral Debridement, Subacromial Decompression, Medium Rotator Cuff Repair (DR), Distal Clavicle Resection \par Diagnosis: Subacromial Impingement, Medium Rotator Cuff Tear, AC Arthralgia, Shoulder Pain, Glenohumeral Synovitis, Glenohumeral Chondrosis, SLAP Tear, Partial Anterior Labral Tear, Partial Posterior Labral Tear\par \par He is still in PT and is making gains, though there is still pain. The MDP helped temporarily.

## 2023-04-12 NOTE — PHYSICAL EXAM
[Right] : right shoulder [Sitting] : sitting [Moderate] : moderate [5 ___] : forward flexion 5[unfilled]/5 [4___] : external rotation 4[unfilled]/5 [] : no sensory deficits [FreeTextEntry3] : No asymmetric atrophy. [FreeTextEntry9] : IR to T10. [de-identified] : There is a positive arc of pain.  [TWNoteComboBox4] : passive forward flexion 160 degrees [TWNoteComboBox6] : internal rotation L2 [de-identified] : external rotation 50 degrees

## 2023-04-12 NOTE — HISTORY OF PRESENT ILLNESS
[7] : 7 [2] : 2 [Intermittent] : intermittent [Household chores] : household chores [Leisure] : leisure [Sleep] : sleep [Lying in bed] : lying in bed [Retired] : Work status: retired [de-identified] : Patient is here for a follow up on his right shoulder. Pain has slightly improved.  [] : no [FreeTextEntry1] : Right shoulder [de-identified] : Physical therapy 2 x a week, HEP.

## 2023-04-19 ENCOUNTER — OFFICE (OUTPATIENT)
Dept: URBAN - METROPOLITAN AREA CLINIC 109 | Facility: CLINIC | Age: 53
Setting detail: OPHTHALMOLOGY
End: 2023-04-19
Payer: COMMERCIAL

## 2023-04-19 DIAGNOSIS — H40.033: ICD-10-CM

## 2023-04-19 PROCEDURE — 92133 CPTRZD OPH DX IMG PST SGM ON: CPT | Performed by: OPHTHALMOLOGY

## 2023-04-19 PROCEDURE — 92012 INTRM OPH EXAM EST PATIENT: CPT | Performed by: OPHTHALMOLOGY

## 2023-04-19 PROCEDURE — 92083 EXTENDED VISUAL FIELD XM: CPT | Performed by: OPHTHALMOLOGY

## 2023-04-19 ASSESSMENT — REFRACTION_CURRENTRX
OS_OVR_VA: 20/
OD_OVR_VA: 20/
OS_SPHERE: +0.50
OS_ADD: +0.50
OD_OVR_VA: 20/
OD_ADD: +0.50
OD_ADD: +1.50
OS_OVR_VA: 20/
OS_VPRISM_DIRECTION: PROGS
OD_VPRISM_DIRECTION: PROGS
OD_SPHERE: +0.50
OD_SPHERE: +0.50
OS_SPHERE: +0.50
OS_ADD: +1.50

## 2023-04-19 ASSESSMENT — TONOMETRY
OD_IOP_MMHG: 17
OS_IOP_MMHG: 17

## 2023-04-19 ASSESSMENT — CORNEAL DYSTROPHY - POSTERIOR
OD_POSTERIORDYSTROPHY: GUTTATA
OS_POSTERIORDYSTROPHY: GUTTATA

## 2023-04-19 ASSESSMENT — REFRACTION_AUTOREFRACTION
OS_AXIS: 57
OD_CYLINDER: -0.25
OD_SPHERE: +0.50
OS_SPHERE: +0.75
OS_CYLINDER: -0.75
OD_AXIS: 129

## 2023-04-19 ASSESSMENT — REFRACTION_MANIFEST
OD_ADD: +0.50
OS_SPHERE: +0.50
OS_ADD: +0.50
OD_SPHERE: +0.50

## 2023-04-19 ASSESSMENT — SPHEQUIV_DERIVED
OD_SPHEQUIV: 0.375
OS_SPHEQUIV: 0.375

## 2023-04-19 ASSESSMENT — VISUAL ACUITY
OS_BCVA: 20/25
OD_BCVA: 20/20-1

## 2023-04-19 ASSESSMENT — SUPERFICIAL PUNCTATE KERATITIS (SPK)
OD_SPK: 1+
OS_SPK: 1+

## 2023-04-19 ASSESSMENT — CONFRONTATIONAL VISUAL FIELD TEST (CVF)
OD_FINDINGS: FULL
OS_FINDINGS: FULL

## 2023-05-18 ENCOUNTER — APPOINTMENT (OUTPATIENT)
Dept: ORTHOPEDIC SURGERY | Facility: CLINIC | Age: 53
End: 2023-05-18
Payer: COMMERCIAL

## 2023-05-18 VITALS — WEIGHT: 140 LBS | BODY MASS INDEX: 23.9 KG/M2 | HEIGHT: 64 IN

## 2023-05-18 DIAGNOSIS — M79.673 PAIN IN UNSPECIFIED FOOT: ICD-10-CM

## 2023-05-18 DIAGNOSIS — S93.602A UNSPECIFIED SPRAIN OF LEFT FOOT, INITIAL ENCOUNTER: ICD-10-CM

## 2023-05-18 PROCEDURE — 99213 OFFICE O/P EST LOW 20 MIN: CPT

## 2023-05-18 PROCEDURE — 73630 X-RAY EXAM OF FOOT: CPT | Mod: LT

## 2023-05-18 NOTE — PHYSICAL EXAM
[Left] : left foot and ankle [NL (40)] : plantar flexion 40 degrees [NL 30)] : inversion 30 degrees [NL (20)] : eversion 20 degrees [5___] : eversion 5[unfilled]/5 [2+] : dorsalis pedis pulse: 2+ [] : patient ambulates without assistive device [FreeTextEntry8] : min dorsolat ttp

## 2023-05-18 NOTE — ASSESSMENT
[FreeTextEntry1] : wbat\par using K tape\par ice/elevate\par nsaids prn\par inc activity as ly\par f/up 4 wks if not resolved

## 2023-05-18 NOTE — HISTORY OF PRESENT ILLNESS
[6] : 6 [0] : 0 [Sharp] : sharp [Rest] : rest [Ice] : ice [Walking] : walking [de-identified] : 05/18/2023: inversion injury 2 days ago w/ foot pain. went to PT who gave k-tape.  no prior foot probs. . no tx to date. walking in regular shoes. 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

## 2023-06-30 ENCOUNTER — OFFICE (OUTPATIENT)
Dept: URBAN - METROPOLITAN AREA CLINIC 109 | Facility: CLINIC | Age: 53
Setting detail: OPHTHALMOLOGY
End: 2023-06-30
Payer: COMMERCIAL

## 2023-06-30 DIAGNOSIS — S05.02XA: ICD-10-CM

## 2023-06-30 DIAGNOSIS — T15.12XA: ICD-10-CM

## 2023-06-30 PROCEDURE — 99213 OFFICE O/P EST LOW 20 MIN: CPT | Performed by: OPHTHALMOLOGY

## 2023-06-30 ASSESSMENT — REFRACTION_CURRENTRX
OS_SPHERE: +0.50
OS_ADD: +1.50
OD_ADD: +1.50
OD_OVR_VA: 20/
OS_OVR_VA: 20/
OD_VPRISM_DIRECTION: PROGS
OS_ADD: +0.50
OD_SPHERE: +0.50
OD_ADD: +0.50
OD_SPHERE: +0.50
OS_VPRISM_DIRECTION: PROGS
OS_SPHERE: +0.50
OD_OVR_VA: 20/
OS_OVR_VA: 20/

## 2023-06-30 ASSESSMENT — REFRACTION_AUTOREFRACTION
OS_CYLINDER: -0.75
OD_SPHERE: +0.50
OD_AXIS: 129
OS_AXIS: 57
OD_CYLINDER: -0.25
OS_SPHERE: +0.75

## 2023-06-30 ASSESSMENT — SUPERFICIAL PUNCTATE KERATITIS (SPK)
OD_SPK: 1+
OS_SPK: 1+

## 2023-06-30 ASSESSMENT — REFRACTION_MANIFEST
OS_SPHERE: +0.50
OD_ADD: +0.50
OD_SPHERE: +0.50
OS_ADD: +0.50

## 2023-06-30 ASSESSMENT — VISUAL ACUITY
OD_BCVA: 20/20-1
OS_BCVA: 20/20-2

## 2023-06-30 ASSESSMENT — SPHEQUIV_DERIVED
OS_SPHEQUIV: 0.375
OD_SPHEQUIV: 0.375

## 2023-06-30 ASSESSMENT — CORNEAL TRAUMA - ABRASION: OS_ABRASION: PRESENT

## 2023-06-30 ASSESSMENT — CONFRONTATIONAL VISUAL FIELD TEST (CVF)
OD_FINDINGS: FULL
OS_FINDINGS: FULL

## 2023-06-30 ASSESSMENT — CORNEAL DYSTROPHY - POSTERIOR
OD_POSTERIORDYSTROPHY: GUTTATA
OS_POSTERIORDYSTROPHY: GUTTATA

## 2023-07-10 ENCOUNTER — OFFICE (OUTPATIENT)
Dept: URBAN - METROPOLITAN AREA CLINIC 109 | Facility: CLINIC | Age: 53
Setting detail: OPHTHALMOLOGY
End: 2023-07-10
Payer: COMMERCIAL

## 2023-07-10 DIAGNOSIS — S05.02XA: ICD-10-CM

## 2023-07-10 PROBLEM — T15.12XA FOREIGN BODY, CONJUNCTIVAL; LEFT EYE INITIAL ENCOUNTER: Status: ACTIVE | Noted: 2023-06-30

## 2023-07-10 PROCEDURE — 99212 OFFICE O/P EST SF 10 MIN: CPT | Performed by: OPHTHALMOLOGY

## 2023-07-10 ASSESSMENT — REFRACTION_CURRENTRX
OD_VPRISM_DIRECTION: PROGS
OD_OVR_VA: 20/
OD_SPHERE: +0.50
OD_ADD: +0.50
OD_SPHERE: +0.50
OS_OVR_VA: 20/
OS_ADD: +0.50
OD_OVR_VA: 20/
OS_ADD: +1.50
OS_SPHERE: +0.50
OS_OVR_VA: 20/
OS_SPHERE: +0.50
OD_ADD: +1.50
OS_VPRISM_DIRECTION: PROGS

## 2023-07-10 ASSESSMENT — REFRACTION_AUTOREFRACTION
OD_AXIS: 129
OS_CYLINDER: -0.75
OS_AXIS: 57
OS_SPHERE: +0.75
OD_SPHERE: +0.50
OD_CYLINDER: -0.25

## 2023-07-10 ASSESSMENT — SUPERFICIAL PUNCTATE KERATITIS (SPK)
OS_SPK: 1+
OD_SPK: 1+

## 2023-07-10 ASSESSMENT — CORNEAL DYSTROPHY - POSTERIOR
OD_POSTERIORDYSTROPHY: GUTTATA
OS_POSTERIORDYSTROPHY: GUTTATA

## 2023-07-10 ASSESSMENT — REFRACTION_MANIFEST
OS_SPHERE: +0.50
OD_SPHERE: +0.50
OD_ADD: +0.50
OS_ADD: +0.50

## 2023-07-10 ASSESSMENT — VISUAL ACUITY
OD_BCVA: 20/20-1
OS_BCVA: 20/20-2

## 2023-07-10 ASSESSMENT — SPHEQUIV_DERIVED
OD_SPHEQUIV: 0.375
OS_SPHEQUIV: 0.375

## 2023-07-10 ASSESSMENT — CONFRONTATIONAL VISUAL FIELD TEST (CVF)
OD_FINDINGS: FULL
OS_FINDINGS: FULL

## 2023-07-10 ASSESSMENT — CORNEAL TRAUMA - ABRASION: OS_ABRASION: PRESENT

## 2023-08-08 ENCOUNTER — OFFICE (OUTPATIENT)
Dept: URBAN - METROPOLITAN AREA CLINIC 109 | Facility: CLINIC | Age: 53
Setting detail: OPHTHALMOLOGY
End: 2023-08-08
Payer: COMMERCIAL

## 2023-08-08 DIAGNOSIS — H25.13: ICD-10-CM

## 2023-08-08 DIAGNOSIS — H43.393: ICD-10-CM

## 2023-08-08 DIAGNOSIS — H40.033: ICD-10-CM

## 2023-08-08 PROCEDURE — 92014 COMPRE OPH EXAM EST PT 1/>: CPT | Performed by: OPHTHALMOLOGY

## 2023-08-08 PROCEDURE — 92250 FUNDUS PHOTOGRAPHY W/I&R: CPT | Performed by: OPHTHALMOLOGY

## 2023-08-08 PROCEDURE — 92083 EXTENDED VISUAL FIELD XM: CPT | Performed by: OPHTHALMOLOGY

## 2023-08-08 ASSESSMENT — REFRACTION_CURRENTRX
OD_ADD: +0.50
OD_SPHERE: +0.50
OS_SPHERE: +0.50
OS_ADD: +0.50
OD_OVR_VA: 20/
OS_ADD: +1.50
OD_ADD: +1.50
OD_OVR_VA: 20/
OS_VPRISM_DIRECTION: PROGS
OS_OVR_VA: 20/
OS_SPHERE: +0.50
OS_OVR_VA: 20/
OD_VPRISM_DIRECTION: PROGS
OD_SPHERE: +0.50

## 2023-08-08 ASSESSMENT — SPHEQUIV_DERIVED
OD_SPHEQUIV: 0.375
OS_SPHEQUIV: 0.375

## 2023-08-08 ASSESSMENT — TONOMETRY
OD_IOP_MMHG: 15
OS_IOP_MMHG: 16

## 2023-08-08 ASSESSMENT — REFRACTION_MANIFEST
OD_ADD: +0.50
OD_SPHERE: +0.50
OS_SPHERE: +0.50
OS_ADD: +0.50

## 2023-08-08 ASSESSMENT — REFRACTION_AUTOREFRACTION
OS_AXIS: 57
OS_SPHERE: +0.75
OS_CYLINDER: -0.75
OD_CYLINDER: -0.25
OD_SPHERE: +0.50
OD_AXIS: 129

## 2023-08-08 ASSESSMENT — CONFRONTATIONAL VISUAL FIELD TEST (CVF)
OS_FINDINGS: FULL
OD_FINDINGS: FULL

## 2023-08-08 ASSESSMENT — CORNEAL TRAUMA - ABRASION: OS_ABRASION: PRESENT

## 2023-08-08 ASSESSMENT — CORNEAL DYSTROPHY - POSTERIOR
OS_POSTERIORDYSTROPHY: GUTTATA
OD_POSTERIORDYSTROPHY: GUTTATA

## 2023-08-08 ASSESSMENT — SUPERFICIAL PUNCTATE KERATITIS (SPK)
OS_SPK: 1+
OD_SPK: 1+

## 2023-08-08 ASSESSMENT — VISUAL ACUITY
OS_BCVA: 20/20-2
OD_BCVA: 20/20-1

## 2023-08-28 ENCOUNTER — APPOINTMENT (OUTPATIENT)
Dept: OTOLARYNGOLOGY | Facility: CLINIC | Age: 53
End: 2023-08-28
Payer: COMMERCIAL

## 2023-08-28 VITALS
HEIGHT: 64 IN | SYSTOLIC BLOOD PRESSURE: 120 MMHG | DIASTOLIC BLOOD PRESSURE: 80 MMHG | BODY MASS INDEX: 23.06 KG/M2 | WEIGHT: 135.05 LBS | HEART RATE: 63 BPM

## 2023-08-28 DIAGNOSIS — H60.63 UNSPECIFIED CHRONIC OTITIS EXTERNA, BILATERAL: ICD-10-CM

## 2023-08-28 DIAGNOSIS — K13.79 OTHER LESIONS OF ORAL MUCOSA: ICD-10-CM

## 2023-08-28 PROCEDURE — 92557 COMPREHENSIVE HEARING TEST: CPT

## 2023-08-28 PROCEDURE — 99213 OFFICE O/P EST LOW 20 MIN: CPT

## 2023-08-28 PROCEDURE — 92567 TYMPANOMETRY: CPT

## 2023-08-28 RX ORDER — TAMSULOSIN HYDROCHLORIDE 0.4 MG/1
CAPSULE ORAL
Refills: 0 | Status: ACTIVE | COMMUNITY

## 2023-08-28 NOTE — HISTORY OF PRESENT ILLNESS
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 Pt denies any worsening of his hearing loss or tinnitus since prior visit. Pt presents today doing well.  Here to evaluate ears and yearly audio.

## 2023-08-28 NOTE — DATA REVIEWED
[de-identified] : AD- Type Ad tymp. AS- Type A tymp. AD- Normal hearing with a mild sensorineural component at 4kHz. AS- Normal hearing with mild sensorineural components at 3 and 4kHz.

## 2023-08-28 NOTE — PHYSICAL EXAM
[] : septum deviated to the right [Midline] : trachea located in midline position [Leukoplakia] : leukoplakia [Normal] : no rashes [Hearing Loss Right Only] : normal [Hearing Loss Left Only] : normal [FreeTextEntry8] : cerumen removed via curettage [FreeTextEntry9] : cerumen removed via curettage [de-identified] : enlarged turbinates

## 2023-08-28 NOTE — ASSESSMENT
[FreeTextEntry1] : Reviewed and reconciled medications, allergies, PMHx, PSHx, SocHx, FMHx.   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 AS- Type A tymp. AD- Normal hearing with a mild sensorineural component at 4kHz. AS- Normal hearing with mild sensorineural components at 3 and 4kHz. Pretty much same as previous. No change since last audio  Plan: Follow up in 6 months

## 2023-08-28 NOTE — CONSULT LETTER
[Dear  ___] : Dear  [unfilled], [Courtesy Letter:] : I had the pleasure of seeing your patient, [unfilled], in my office today. [Referral Closing:] : Thank you very much for seeing this patient.  If you have any questions, please do not hesitate to contact me. [Sincerely,] : Sincerely, [FreeTextEntry3] : Pal Zapata PA-C

## 2023-09-25 ENCOUNTER — APPOINTMENT (OUTPATIENT)
Dept: ORTHOPEDIC SURGERY | Facility: CLINIC | Age: 53
End: 2023-09-25
Payer: COMMERCIAL

## 2023-09-25 PROCEDURE — 72170 X-RAY EXAM OF PELVIS: CPT

## 2023-09-25 PROCEDURE — 72110 X-RAY EXAM L-2 SPINE 4/>VWS: CPT

## 2023-09-25 PROCEDURE — 99214 OFFICE O/P EST MOD 30 MIN: CPT

## 2023-09-29 ENCOUNTER — APPOINTMENT (OUTPATIENT)
Dept: MRI IMAGING | Facility: CLINIC | Age: 53
End: 2023-09-29
Payer: COMMERCIAL

## 2023-09-29 PROCEDURE — 72148 MRI LUMBAR SPINE W/O DYE: CPT

## 2023-10-04 ENCOUNTER — APPOINTMENT (OUTPATIENT)
Dept: ORTHOPEDIC SURGERY | Facility: CLINIC | Age: 53
End: 2023-10-04
Payer: COMMERCIAL

## 2023-10-04 DIAGNOSIS — M51.26 OTHER INTERVERTEBRAL DISC DISPLACEMENT, LUMBAR REGION: ICD-10-CM

## 2023-10-04 PROCEDURE — 99214 OFFICE O/P EST MOD 30 MIN: CPT

## 2023-10-10 ENCOUNTER — APPOINTMENT (OUTPATIENT)
Dept: MRI IMAGING | Facility: CLINIC | Age: 53
End: 2023-10-10
Payer: COMMERCIAL

## 2023-10-10 PROCEDURE — 72195 MRI PELVIS W/O DYE: CPT

## 2023-10-30 ENCOUNTER — APPOINTMENT (OUTPATIENT)
Dept: ORTHOPEDIC SURGERY | Facility: CLINIC | Age: 53
End: 2023-10-30
Payer: COMMERCIAL

## 2023-10-30 VITALS — BODY MASS INDEX: 23.05 KG/M2 | WEIGHT: 135 LBS | HEIGHT: 64 IN

## 2023-10-30 DIAGNOSIS — M54.16 RADICULOPATHY, LUMBAR REGION: ICD-10-CM

## 2023-10-30 DIAGNOSIS — S32.10XD UNSPECIFIED FRACTURE OF SACRUM, SUBSEQUENT ENCOUNTER FOR FRACTURE WITH ROUTINE HEALING: ICD-10-CM

## 2023-10-30 DIAGNOSIS — M43.16 SPONDYLOLISTHESIS, LUMBAR REGION: ICD-10-CM

## 2023-10-30 PROCEDURE — 99214 OFFICE O/P EST MOD 30 MIN: CPT

## 2023-11-29 ENCOUNTER — NON-APPOINTMENT (OUTPATIENT)
Age: 53
End: 2023-11-29

## 2024-01-05 ENCOUNTER — APPOINTMENT (OUTPATIENT)
Dept: ORTHOPEDIC SURGERY | Facility: CLINIC | Age: 54
End: 2024-01-05
Payer: COMMERCIAL

## 2024-01-05 VITALS — HEIGHT: 64 IN | BODY MASS INDEX: 23.56 KG/M2 | WEIGHT: 138 LBS

## 2024-01-05 PROCEDURE — 73030 X-RAY EXAM OF SHOULDER: CPT | Mod: LT

## 2024-01-05 PROCEDURE — 73010 X-RAY EXAM OF SHOULDER BLADE: CPT | Mod: LT

## 2024-01-05 PROCEDURE — 99214 OFFICE O/P EST MOD 30 MIN: CPT

## 2024-01-05 NOTE — ASSESSMENT
[FreeTextEntry1] : We reviewed the findings and the history. Questions were answered and concerns addressed. The options were outlined. PT for HEP planned. MDP advised. Should his sx persist, we will obtain an MRI.  Patient was seen by Dr. Carloz Burr. Patient was seen by Suzette YBARRA under the supervision of Dr. Carloz Burr. Progress note was completed by Suzette YBARRA.

## 2024-01-05 NOTE — PHYSICAL EXAM
[Left] : left shoulder [Standing] : standing [5 ___] : forward flexion 5[unfilled]/5 [5___] : external rotation 5[unfilled]/5 [Right] : right shoulder [] : no sensory deficits [de-identified] : -bellypress [FreeTextEntry9] : IR to T8. [TWNoteComboBox4] : passive forward flexion 165 degrees [de-identified] : external rotation 75 degrees

## 2024-01-05 NOTE — REASON FOR VISIT
[FreeTextEntry2] : This is a 53 year old RHD M  with left shoulder pain since late 2023.  No history.  It may be related to bench pressing.  Reaching and lifting are sore.  No numbness.  On 10/18/22, Dr. Burr performed a Right Shoulder Arthroscopy, Glenohumeral Debridement, Subacromial Decompression, Medium Rotator Cuff Repair (DR), Distal Clavicle Resection.  This side is doing fine.

## 2024-01-05 NOTE — IMAGING
[Left] : left shoulder [FreeTextEntry1] : The GH is OK.  There is a superior AC spur. [FreeTextEntry5] : There is a Type II acromion.

## 2024-01-05 NOTE — CONSULT LETTER
[Dear  ___] : Dear  [unfilled], [Consult Letter:] : I had the pleasure of evaluating your patient, [unfilled]. [Please see my note below.] : Please see my note below. [Consult Closing:] : Thank you very much for allowing me to participate in the care of this patient.  If you have any questions, please do not hesitate to contact me. [Sincerely,] : Sincerely, [FreeTextEntry3] : Carloz Burr M.D. Shoulder Surgery

## 2024-01-05 NOTE — HISTORY OF PRESENT ILLNESS
[de-identified] : 52 yo RHD M here for left shoulder pain that began 1 week ago. No specific injury but states he might have hurt the left shoulder while bench pressing. No prior issues to the left shoulder.

## 2024-02-06 ENCOUNTER — OFFICE (OUTPATIENT)
Dept: URBAN - METROPOLITAN AREA CLINIC 109 | Facility: CLINIC | Age: 54
Setting detail: OPHTHALMOLOGY
End: 2024-02-06
Payer: COMMERCIAL

## 2024-02-06 DIAGNOSIS — H40.033: ICD-10-CM

## 2024-02-06 DIAGNOSIS — H25.13: ICD-10-CM

## 2024-02-06 PROCEDURE — 92012 INTRM OPH EXAM EST PATIENT: CPT | Performed by: OPHTHALMOLOGY

## 2024-02-06 PROCEDURE — 92133 CPTRZD OPH DX IMG PST SGM ON: CPT | Performed by: OPHTHALMOLOGY

## 2024-02-06 PROCEDURE — 92083 EXTENDED VISUAL FIELD XM: CPT | Performed by: OPHTHALMOLOGY

## 2024-02-06 ASSESSMENT — REFRACTION_CURRENTRX
OD_OVR_VA: 20/
OS_SPHERE: +0.50
OD_ADD: +0.50
OS_VPRISM_DIRECTION: PROGS
OD_OVR_VA: 20/
OS_ADD: +0.50
OS_ADD: +1.50
OD_SPHERE: +0.50
OD_SPHERE: +0.50
OS_SPHERE: +0.50
OD_VPRISM_DIRECTION: PROGS
OS_OVR_VA: 20/
OS_OVR_VA: 20/
OD_ADD: +1.50

## 2024-02-06 ASSESSMENT — REFRACTION_MANIFEST
OD_SPHERE: +0.50
OD_ADD: +0.50
OS_SPHERE: +0.50
OS_ADD: +0.50

## 2024-02-06 ASSESSMENT — REFRACTION_AUTOREFRACTION
OD_AXIS: 129
OD_SPHERE: +1.50
OD_CYLINDER: -0.25
OS_AXIS: 65
OS_SPHERE: +1.50
OS_CYLINDER: -0.75

## 2024-02-06 ASSESSMENT — CORNEAL TRAUMA - ABRASION: OS_ABRASION: PRESENT

## 2024-02-06 ASSESSMENT — SPHEQUIV_DERIVED
OS_SPHEQUIV: 1.125
OD_SPHEQUIV: 1.375

## 2024-02-06 ASSESSMENT — CORNEAL DYSTROPHY - POSTERIOR
OD_POSTERIORDYSTROPHY: GUTTATA
OS_POSTERIORDYSTROPHY: GUTTATA

## 2024-02-06 ASSESSMENT — CONFRONTATIONAL VISUAL FIELD TEST (CVF)
OD_FINDINGS: FULL
OS_FINDINGS: FULL

## 2024-02-06 ASSESSMENT — SUPERFICIAL PUNCTATE KERATITIS (SPK)
OS_SPK: 1+
OD_SPK: 1+

## 2024-02-21 ENCOUNTER — APPOINTMENT (OUTPATIENT)
Dept: ORTHOPEDIC SURGERY | Facility: CLINIC | Age: 54
End: 2024-02-21
Payer: COMMERCIAL

## 2024-02-21 VITALS — BODY MASS INDEX: 23.56 KG/M2 | HEIGHT: 64 IN | WEIGHT: 138 LBS

## 2024-02-21 PROCEDURE — 99214 OFFICE O/P EST MOD 30 MIN: CPT

## 2024-02-26 ENCOUNTER — APPOINTMENT (OUTPATIENT)
Dept: OTOLARYNGOLOGY | Facility: CLINIC | Age: 54
End: 2024-02-26

## 2024-02-27 ENCOUNTER — APPOINTMENT (OUTPATIENT)
Dept: MRI IMAGING | Facility: CLINIC | Age: 54
End: 2024-02-27
Payer: COMMERCIAL

## 2024-02-27 PROCEDURE — 73221 MRI JOINT UPR EXTREM W/O DYE: CPT | Mod: LT

## 2024-03-04 ENCOUNTER — APPOINTMENT (OUTPATIENT)
Dept: ORTHOPEDIC SURGERY | Facility: CLINIC | Age: 54
End: 2024-03-04
Payer: COMMERCIAL

## 2024-03-04 DIAGNOSIS — M75.112 INCOMPLETE ROTATOR CUFF TEAR OR RUPTURE OF LEFT SHOULDER, NOT SPECIFIED AS TRAUMATIC: ICD-10-CM

## 2024-03-04 PROCEDURE — 99214 OFFICE O/P EST MOD 30 MIN: CPT | Mod: 25

## 2024-03-04 PROCEDURE — 20611 DRAIN/INJ JOINT/BURSA W/US: CPT | Mod: LT

## 2024-03-04 NOTE — PHYSICAL EXAM
[Left] : left shoulder [Standing] : standing [5 ___] : forward flexion 5[unfilled]/5 [5___] : external rotation 5[unfilled]/5 [Right] : right shoulder [] : no sensory deficits [de-identified] : -bellypress [FreeTextEntry9] : IR to T8. [de-identified] : external rotation 75 degrees [TWNoteComboBox4] : passive forward flexion 165 degrees

## 2024-03-04 NOTE — DATA REVIEWED
[FreeTextEntry1] : MRI L SHOULDER [facility] 2/27/24:  There are slight AC changes. There is questionable biceps subluxation. There is biceps fluid. There is partial supraspinatus tearing. There is good muscle. There is labral tearing and GH chondral loss reported.

## 2024-03-04 NOTE — ASSESSMENT
[FreeTextEntry1] : We reviewed the MRI findings.  PT is planned.  He will continue with HEP.  An SA/GH injection is planned today.  Questions answered.   Patient was seen by Dr. Carloz Burr. Patient was seen by Suzette YBARRA under the supervision of Dr. Carloz Burr. Progress note was completed by Suzette YBARRA. Entered by Shanna Mclaughlin acting as scribe.  Procedure Name: Large Joint Injection / Aspiration: Depomedrol, Lidocaine and Guidance Ultrasound   Large Joint Injection was performed because of pain and inflammation. Depomedrol: An injection of Depomedrol 40 mg , 2 cc. Lidocaine: An injection of Lidocaine 1 mg , 13 cc.   Medication was injected in the left subacromial space and glenohumeral joint. Patient has tried OTC's including aspirin, Ibuprofen, Aleve etc. or prescription NSAIDS, and/or exercises at home and/ or physical therapy without satisfactory response. The risks, benefits, and alternatives to steroid injection were explained in full to the patient. Risks outlined include but are not limited to infection, sepsis, bleeding, scarring, skin discoloration, temporary increase in pain, syncopal episode, failure to resolve symptoms, allergic reaction, symptom recurrence, and elevation of blood sugar in diabetics. Patient understood the risks. All questions were answered. After discussion, patient requested an injection. Oral informed consent was obtained.  Sterile preparation with betadine and aseptic technique was utilized for the procedure, including the preparation of the solutions used for the injection. Patient tolerated the procedure well.  Post Procedure Instructions: Patient was advised to call if redness, pain, or fever occur and apply ice for 15 min. out of every hour for the next 12-24 hours as tolerated. Patient was advised to rest the joint(s) for 3 days.  Advised to ice the injection site this evening. Ultrasound Guidance was used for the following reasons: for precise injection in area of tear. Visualization of the needle and placement of injection was performed without complication.

## 2024-03-04 NOTE — REASON FOR VISIT
[FreeTextEntry2] : This is a 53 year old RHD M  with left shoulder pain since late 2023.  No history.  It may be related to bench pressing.  Reaching and lifting are sore.  No numbness.  On 10/18/22, Dr. Burr performed a Right Shoulder Arthroscopy, Glenohumeral Debridement, Subacromial Decompression, Medium Rotator Cuff Repair (DR), Distal Clavicle Resection.  The right side is doing fine.  With the medrol and PT he feels 50% better.  The MRI was done.  He still has pain.

## 2024-03-04 NOTE — HISTORY OF PRESENT ILLNESS
[7] : 7 [3] : 3 [Sharp] : sharp [Constant] : constant [Household chores] : household chores [Work] : work [Sleep] : sleep [Physical therapy] : physical therapy [Full time] : Work status: full time [de-identified] : Patient is here for left shoulder MRI results.  [FreeTextEntry1] : Left shoulder [] : no [de-identified] : certain movements [de-identified] : Physical therapy 2 x a week, HEP

## 2024-04-03 ENCOUNTER — APPOINTMENT (OUTPATIENT)
Dept: OTOLARYNGOLOGY | Facility: CLINIC | Age: 54
End: 2024-04-03
Payer: COMMERCIAL

## 2024-04-03 VITALS
HEIGHT: 64 IN | BODY MASS INDEX: 23.39 KG/M2 | WEIGHT: 137 LBS | SYSTOLIC BLOOD PRESSURE: 117 MMHG | DIASTOLIC BLOOD PRESSURE: 84 MMHG | HEART RATE: 65 BPM

## 2024-04-03 DIAGNOSIS — J31.0 CHRONIC RHINITIS: ICD-10-CM

## 2024-04-03 DIAGNOSIS — R04.0 EPISTAXIS: ICD-10-CM

## 2024-04-03 DIAGNOSIS — R42 DIZZINESS AND GIDDINESS: ICD-10-CM

## 2024-04-03 DIAGNOSIS — H90.3 SENSORINEURAL HEARING LOSS, BILATERAL: ICD-10-CM

## 2024-04-03 DIAGNOSIS — H60.63 UNSPECIFIED CHRONIC OTITIS EXTERNA, BILATERAL: ICD-10-CM

## 2024-04-03 DIAGNOSIS — J34.2 DEVIATED NASAL SEPTUM: ICD-10-CM

## 2024-04-03 PROCEDURE — 92557 COMPREHENSIVE HEARING TEST: CPT

## 2024-04-03 PROCEDURE — 99214 OFFICE O/P EST MOD 30 MIN: CPT

## 2024-04-03 PROCEDURE — 92550 TYMPANOMETRY & REFLEX THRESH: CPT

## 2024-04-03 RX ORDER — METHYLPREDNISOLONE 4 MG/1
4 TABLET ORAL
Qty: 1 | Refills: 0 | Status: DISCONTINUED | COMMUNITY
Start: 2022-08-12 | End: 2024-04-03

## 2024-04-03 RX ORDER — IBUPROFEN 600 MG/1
600 TABLET, FILM COATED ORAL 3 TIMES DAILY
Qty: 120 | Refills: 0 | Status: DISCONTINUED | COMMUNITY
Start: 2022-04-21 | End: 2024-04-03

## 2024-04-03 RX ORDER — MELOXICAM 15 MG/1
15 TABLET ORAL
Qty: 30 | Refills: 1 | Status: DISCONTINUED | COMMUNITY
Start: 2022-12-12 | End: 2024-04-03

## 2024-04-03 RX ORDER — MELOXICAM 15 MG/1
15 TABLET ORAL
Qty: 30 | Refills: 1 | Status: DISCONTINUED | COMMUNITY
Start: 2023-10-30 | End: 2024-04-03

## 2024-04-03 RX ORDER — MELOXICAM 7.5 MG/1
7.5 TABLET ORAL
Qty: 60 | Refills: 0 | Status: DISCONTINUED | COMMUNITY
Start: 2023-10-30 | End: 2024-04-03

## 2024-04-03 RX ORDER — HYDROCODONE BITARTRATE AND ACETAMINOPHEN 7.5; 325 MG/1; MG/1
7.5-325 TABLET ORAL
Qty: 42 | Refills: 0 | Status: DISCONTINUED | COMMUNITY
Start: 2022-10-12 | End: 2024-04-03

## 2024-04-03 RX ORDER — CHROMIUM 200 MCG
1000 TABLET ORAL
Refills: 0 | Status: DISCONTINUED | COMMUNITY
End: 2024-04-03

## 2024-04-03 RX ORDER — HYDROCODONE BITARTRATE AND ACETAMINOPHEN 7.5; 325 MG/1; MG/1
7.5-325 TABLET ORAL
Qty: 42 | Refills: 0 | Status: DISCONTINUED | COMMUNITY
Start: 2022-12-12 | End: 2024-04-03

## 2024-04-03 RX ORDER — ALPRAZOLAM 0.25 MG/1
0.25 TABLET ORAL
Qty: 40 | Refills: 0 | Status: DISCONTINUED | COMMUNITY
Start: 2020-11-11 | End: 2024-04-03

## 2024-04-03 RX ORDER — METHYLPREDNISOLONE 4 MG/1
4 TABLET ORAL
Qty: 1 | Refills: 0 | Status: DISCONTINUED | COMMUNITY
Start: 2023-09-25 | End: 2024-04-03

## 2024-04-03 RX ORDER — GABAPENTIN 300 MG/1
300 CAPSULE ORAL 3 TIMES DAILY
Qty: 15 | Refills: 0 | Status: DISCONTINUED | COMMUNITY
Start: 2022-10-12 | End: 2024-04-03

## 2024-04-03 RX ORDER — KETOROLAC TROMETHAMINE 10 MG/1
10 TABLET, FILM COATED ORAL EVERY 6 HOURS
Qty: 20 | Refills: 0 | Status: DISCONTINUED | COMMUNITY
Start: 2022-10-12 | End: 2024-04-03

## 2024-04-03 RX ORDER — METHYLPREDNISOLONE 4 MG/1
4 TABLET ORAL
Qty: 1 | Refills: 0 | Status: DISCONTINUED | COMMUNITY
Start: 2023-01-30 | End: 2024-04-03

## 2024-04-03 RX ORDER — METHYLPREDNISOLONE 4 MG/1
4 TABLET ORAL
Qty: 1 | Refills: 0 | Status: DISCONTINUED | COMMUNITY
Start: 2024-01-05 | End: 2024-04-03

## 2024-04-03 RX ORDER — NAPROXEN 500 MG/1
500 TABLET ORAL
Qty: 60 | Refills: 0 | Status: DISCONTINUED | COMMUNITY
Start: 2022-06-02 | End: 2024-04-03

## 2024-04-03 NOTE — PHYSICAL EXAM
[Hearing Diamond Test (Tuning Fork On Forehead)] : no lateralization of tone [Midline] : trachea located in midline position [Removed] : palatine tonsils previously removed [Normal] : orientation to person, place, and time: normal [Hearing Loss Right Only] : normal [Hearing Loss Left Only] : normal [FreeTextEntry8] :  cerumen removed via curettage [FreeTextEntry9] :  cerumen removed via curettage [de-identified] : petechiae on the external nose on the nasal dorsum [de-identified] : dry blood in the left nasal cavity [de-identified] : type 3 oral cavity

## 2024-04-03 NOTE — ADDENDUM
[FreeTextEntry1] :  Documented by Wayne Stafford acting as scribe for Dr. Bazan on 04/03/2024. All Medical record entries made by the Scribe were at my, Dr. Bazan, direction and personally dictated by me on 04/03/2024 . I have reviewed the chart and agree that the record accurately reflects my personal performance of the history, physical exam, assessment and plan. I have also personally directed, reviewed, and agreed with the discharge instructions.

## 2024-04-03 NOTE — HISTORY OF PRESENT ILLNESS
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 He denies changes in his hearing since last visit. He states that he started having vertigo about a week and a half ago. He states that the vertigo occurs when he lays on the floor or is standing. He states that sometimes his nosebleeds. He denies tinnitus.

## 2024-04-03 NOTE — DATA REVIEWED
[de-identified] : -TYMPS: TYPE Ad AD, TYPE A AS -HEARING WNL TO AN ESSENTIALLY MILD SNHL 250-8000 HZ AU

## 2024-04-03 NOTE — ASSESSMENT
[FreeTextEntry1] :  Reviewed and reconciled medications, allergies, PMHx, PSHx, SocHx, FMHx.  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 He denies changes in his hearing since last visit. He states that he started having vertigo about a week and a half ago. He states that the vertigo occurs when he lays on the floor or is standing. He states that sometimes his nosebleeds. He denies tinnitus.  Physical exam: -right ear canal: cerumen removed via curettage (L>R) -left ear canal: cerumen removed via curettage (L>R) -no lateralization to tuning forks -petechiae on the external nose on the nasal dorsum -dry blood in the left nasal cavity -type 3 oral cavity -no nystagmus -vertical head roll: negative -horizontal head roll: negative -romberg: negative, but unsteady  Audio: -TYMPS: TYPE Ad AD, TYPE A AS (ETF WNL) -HEARING WNL TO AN ESSENTIALLY MILD SNHL 250-8000 HZ AU -essentially stable from 8/28/23 Audio -100% understanding in the right ear at 55 dB -96% understanding in the left ear at 55 dB -right ear pressure: +9 -left ear pressure: +26  Plan:  Audio - results interpreted by Dr. Bazan and reviewed with the patient. -Continue using saline gel spray- 3-4 times per day -Ordered VNG -FU in 1 year with results from [facility]

## 2024-04-22 ENCOUNTER — APPOINTMENT (OUTPATIENT)
Dept: ORTHOPEDIC SURGERY | Facility: CLINIC | Age: 54
End: 2024-04-22
Payer: COMMERCIAL

## 2024-04-22 VITALS — BODY MASS INDEX: 23.39 KG/M2 | WEIGHT: 137 LBS | HEIGHT: 64 IN

## 2024-04-22 DIAGNOSIS — M75.22 BICIPITAL TENDINITIS, LEFT SHOULDER: ICD-10-CM

## 2024-04-22 DIAGNOSIS — M75.42 IMPINGEMENT SYNDROME OF LEFT SHOULDER: ICD-10-CM

## 2024-04-22 PROCEDURE — 99214 OFFICE O/P EST MOD 30 MIN: CPT

## 2024-04-22 RX ORDER — NAPROXEN 500 MG/1
500 TABLET ORAL
Qty: 60 | Refills: 0 | Status: ACTIVE | COMMUNITY
Start: 2024-04-22 | End: 1900-01-01

## 2024-04-22 NOTE — ASSESSMENT
[FreeTextEntry1] : We discussed the underlying pathology.  Treatment options reviewed.  Renewed Naproxen. Plan for continued PT   Cautions discussed.  Questions answered.   Patient seen by Carloz Burr M.D. Entered by Sarahi Ann acting as scribe.

## 2024-04-22 NOTE — REASON FOR VISIT
[FreeTextEntry2] : This is a 54 year old RHD M  with left shoulder pain since late 2023.  No history.  It may be related to bench pressing.  Reaching and lifting are sore.  No numbness.  On 10/18/22, Dr. Burr performed a Right Shoulder Arthroscopy, Glenohumeral Debridement, Subacromial Decompression, Medium Rotator Cuff Repair (), Distal Clavicle Resection.  The right side is doing fine.  The MRI was done.  SA injection on 3/4/24 gave relief up until 1 week ago. He took naproxen and is now feeling better.  PT has been helpful.

## 2024-04-22 NOTE — PHYSICAL EXAM
[Left] : left shoulder [Standing] : standing [5 ___] : forward flexion 5[unfilled]/5 [5___] : external rotation 5[unfilled]/5 [Right] : right shoulder [Mild] : mild [] : no sensory deficits [de-identified] : -bellypress [de-identified] : Speeds test is minimally painful [FreeTextEntry9] : IR to T8. [TWNoteComboBox4] : passive forward flexion 165 degrees [de-identified] : external rotation 75 degrees

## 2024-04-22 NOTE — HISTORY OF PRESENT ILLNESS
[Sharp] : sharp [Constant] : constant [Household chores] : household chores [Work] : work [Sleep] : sleep [Physical therapy] : physical therapy [Full time] : Work status: full time [de-identified] : Patient is here for left shoulder follow up. Injection done on 3/4/24 which gave some relief until 1 week ago. Continuing PT which is helping.  [] : no [de-identified] : certain movements [de-identified] : Physical therapy 2 x a week, HEP

## 2024-06-03 ENCOUNTER — APPOINTMENT (OUTPATIENT)
Dept: OTOLARYNGOLOGY | Facility: CLINIC | Age: 54
End: 2024-06-03

## 2024-08-16 ENCOUNTER — OFFICE (OUTPATIENT)
Dept: URBAN - METROPOLITAN AREA CLINIC 109 | Facility: CLINIC | Age: 54
Setting detail: OPHTHALMOLOGY
End: 2024-08-16
Payer: COMMERCIAL

## 2024-08-16 DIAGNOSIS — H40.033: ICD-10-CM

## 2024-08-16 DIAGNOSIS — H25.13: ICD-10-CM

## 2024-08-16 DIAGNOSIS — H18.513: ICD-10-CM

## 2024-08-16 DIAGNOSIS — H16.223: ICD-10-CM

## 2024-08-16 DIAGNOSIS — H43.393: ICD-10-CM

## 2024-08-16 PROCEDURE — 92083 EXTENDED VISUAL FIELD XM: CPT | Performed by: OPHTHALMOLOGY

## 2024-08-16 PROCEDURE — 92250 FUNDUS PHOTOGRAPHY W/I&R: CPT | Performed by: OPHTHALMOLOGY

## 2024-08-16 PROCEDURE — 92014 COMPRE OPH EXAM EST PT 1/>: CPT | Performed by: OPHTHALMOLOGY

## 2024-08-16 ASSESSMENT — CONFRONTATIONAL VISUAL FIELD TEST (CVF)
OS_FINDINGS: FULL
OD_FINDINGS: FULL

## 2024-08-30 ENCOUNTER — NON-APPOINTMENT (OUTPATIENT)
Age: 54
End: 2024-08-30

## 2024-10-30 ENCOUNTER — APPOINTMENT (OUTPATIENT)
Dept: OTOLARYNGOLOGY | Facility: CLINIC | Age: 54
End: 2024-10-30
Payer: COMMERCIAL

## 2024-10-30 VITALS — BODY MASS INDEX: 23.75 KG/M2 | WEIGHT: 139.13 LBS | HEIGHT: 64 IN

## 2024-10-30 DIAGNOSIS — J31.0 CHRONIC RHINITIS: ICD-10-CM

## 2024-10-30 DIAGNOSIS — H90.3 SENSORINEURAL HEARING LOSS, BILATERAL: ICD-10-CM

## 2024-10-30 DIAGNOSIS — J34.89 OTHER SPECIFIED DISORDERS OF NOSE AND NASAL SINUSES: ICD-10-CM

## 2024-10-30 DIAGNOSIS — H60.63 UNSPECIFIED CHRONIC OTITIS EXTERNA, BILATERAL: ICD-10-CM

## 2024-10-30 DIAGNOSIS — J34.2 DEVIATED NASAL SEPTUM: ICD-10-CM

## 2024-10-30 DIAGNOSIS — J34.3 HYPERTROPHY OF NASAL TURBINATES: ICD-10-CM

## 2024-10-30 PROCEDURE — 99214 OFFICE O/P EST MOD 30 MIN: CPT

## 2024-10-30 RX ORDER — FINASTERIDE 1 MG/1
TABLET ORAL
Refills: 0 | Status: ACTIVE | COMMUNITY

## 2024-11-27 ENCOUNTER — APPOINTMENT (OUTPATIENT)
Dept: ORTHOPEDIC SURGERY | Facility: CLINIC | Age: 54
End: 2024-11-27
Payer: COMMERCIAL

## 2024-11-27 DIAGNOSIS — M54.16 RADICULOPATHY, LUMBAR REGION: ICD-10-CM

## 2024-11-27 DIAGNOSIS — M51.26 OTHER INTERVERTEBRAL DISC DISPLACEMENT, LUMBAR REGION: ICD-10-CM

## 2024-11-27 DIAGNOSIS — M47.816 SPONDYLOSIS W/OUT MYELOPATHY OR RADICULOPATHY, LUMBAR REGION: ICD-10-CM

## 2024-11-27 PROCEDURE — 99214 OFFICE O/P EST MOD 30 MIN: CPT

## 2024-11-27 PROCEDURE — 72170 X-RAY EXAM OF PELVIS: CPT

## 2024-11-27 PROCEDURE — 72100 X-RAY EXAM L-S SPINE 2/3 VWS: CPT

## 2024-11-27 RX ORDER — METHYLPREDNISOLONE 4 MG/1
4 TABLET ORAL
Qty: 1 | Refills: 0 | Status: ACTIVE | COMMUNITY
Start: 2024-11-27 | End: 1900-01-01

## 2025-05-07 ENCOUNTER — APPOINTMENT (OUTPATIENT)
Dept: OTOLARYNGOLOGY | Facility: CLINIC | Age: 55
End: 2025-05-07
Payer: COMMERCIAL

## 2025-05-07 VITALS
SYSTOLIC BLOOD PRESSURE: 118 MMHG | BODY MASS INDEX: 23.22 KG/M2 | DIASTOLIC BLOOD PRESSURE: 78 MMHG | HEIGHT: 64 IN | HEART RATE: 65 BPM | WEIGHT: 136 LBS

## 2025-05-07 DIAGNOSIS — K13.79 OTHER LESIONS OF ORAL MUCOSA: ICD-10-CM

## 2025-05-07 DIAGNOSIS — K13.21 LEUKOPLAKIA OF ORAL MUCOSA, INCLUDING TONGUE: ICD-10-CM

## 2025-05-07 DIAGNOSIS — J34.3 HYPERTROPHY OF NASAL TURBINATES: ICD-10-CM

## 2025-05-07 DIAGNOSIS — H60.63 UNSPECIFIED CHRONIC OTITIS EXTERNA, BILATERAL: ICD-10-CM

## 2025-05-07 DIAGNOSIS — J31.0 CHRONIC RHINITIS: ICD-10-CM

## 2025-05-07 DIAGNOSIS — J34.2 DEVIATED NASAL SEPTUM: ICD-10-CM

## 2025-05-07 PROCEDURE — 92557 COMPREHENSIVE HEARING TEST: CPT

## 2025-05-07 PROCEDURE — 99213 OFFICE O/P EST LOW 20 MIN: CPT

## 2025-05-07 PROCEDURE — 92567 TYMPANOMETRY: CPT

## 2025-05-09 ENCOUNTER — NON-APPOINTMENT (OUTPATIENT)
Age: 55
End: 2025-05-09

## 2025-05-14 ENCOUNTER — APPOINTMENT (OUTPATIENT)
Dept: OTOLARYNGOLOGY | Facility: CLINIC | Age: 55
End: 2025-05-14
Payer: COMMERCIAL

## 2025-05-14 PROCEDURE — 92653 AEP NEURODIAGNOSTIC I&R: CPT

## 2025-08-04 ENCOUNTER — APPOINTMENT (OUTPATIENT)
Dept: ORTHOPEDIC SURGERY | Facility: CLINIC | Age: 55
End: 2025-08-04
Payer: COMMERCIAL

## 2025-08-04 DIAGNOSIS — S23.41XA SPRAIN OF RIBS, INITIAL ENCOUNTER: ICD-10-CM

## 2025-08-04 PROCEDURE — 99213 OFFICE O/P EST LOW 20 MIN: CPT

## 2025-08-04 PROCEDURE — 71100 X-RAY EXAM RIBS UNI 2 VIEWS: CPT | Mod: LT

## 2025-08-20 ENCOUNTER — OFFICE (OUTPATIENT)
Dept: URBAN - METROPOLITAN AREA CLINIC 109 | Facility: CLINIC | Age: 55
Setting detail: OPHTHALMOLOGY
End: 2025-08-20
Payer: COMMERCIAL

## 2025-08-20 DIAGNOSIS — H40.033: ICD-10-CM

## 2025-08-20 PROCEDURE — 92014 COMPRE OPH EXAM EST PT 1/>: CPT | Performed by: OPHTHALMOLOGY

## 2025-08-20 PROCEDURE — 92083 EXTENDED VISUAL FIELD XM: CPT | Performed by: OPHTHALMOLOGY

## 2025-08-20 ASSESSMENT — REFRACTION_CURRENTRX
OD_OVR_VA: 20/
OS_ADD: +0.50
OD_SPHERE: +0.50
OD_SPHERE: +0.50
OD_VPRISM_DIRECTION: PROGS
OD_OVR_VA: 20/
OS_OVR_VA: 20/
OS_SPHERE: +0.50
OD_ADD: +0.50
OS_VPRISM_DIRECTION: PROGS
OS_OVR_VA: 20/
OD_ADD: +1.50
OS_SPHERE: +0.50
OS_ADD: +1.50

## 2025-08-20 ASSESSMENT — SUPERFICIAL PUNCTATE KERATITIS (SPK)
OS_SPK: 1+
OD_SPK: 1+

## 2025-08-20 ASSESSMENT — KERATOMETRY
OD_K2POWER_DIOPTERS: 45.50
OS_K1POWER_DIOPTERS: 45.25
OS_K2POWER_DIOPTERS: 46.00
OS_AXISANGLE_DEGREES: 111
OD_K1POWER_DIOPTERS: 44.75
OD_AXISANGLE_DEGREES: 82

## 2025-08-20 ASSESSMENT — REFRACTION_AUTOREFRACTION
OD_CYLINDER: -0.25
OS_AXIS: 61
OD_SPHERE: +1.00
OD_AXIS: 137
OS_CYLINDER: -0.75
OS_SPHERE: +1.00

## 2025-08-20 ASSESSMENT — REFRACTION_MANIFEST
OD_ADD: +0.50
OS_ADD: +0.50
OS_SPHERE: +0.50
OD_SPHERE: +0.50

## 2025-08-20 ASSESSMENT — TONOMETRY
OS_IOP_MMHG: 17
OD_IOP_MMHG: 16

## 2025-08-20 ASSESSMENT — CONFRONTATIONAL VISUAL FIELD TEST (CVF)
OS_FINDINGS: FULL
OD_FINDINGS: FULL

## 2025-08-20 ASSESSMENT — VISUAL ACUITY
OD_BCVA: 20/20-2
OS_BCVA: 20/25

## 2025-08-20 ASSESSMENT — CORNEAL DYSTROPHY - POSTERIOR
OD_POSTERIORDYSTROPHY: GUTTATA
OS_POSTERIORDYSTROPHY: GUTTATA

## 2025-09-03 ENCOUNTER — RX RENEWAL (OUTPATIENT)
Age: 55
End: 2025-09-03

## 2025-09-03 ENCOUNTER — NON-APPOINTMENT (OUTPATIENT)
Age: 55
End: 2025-09-03

## 2025-09-03 ENCOUNTER — APPOINTMENT (OUTPATIENT)
Dept: OTOLARYNGOLOGY | Facility: CLINIC | Age: 55
End: 2025-09-03
Payer: COMMERCIAL

## 2025-09-03 VITALS
BODY MASS INDEX: 23.56 KG/M2 | WEIGHT: 138 LBS | DIASTOLIC BLOOD PRESSURE: 80 MMHG | HEART RATE: 79 BPM | SYSTOLIC BLOOD PRESSURE: 122 MMHG | HEIGHT: 64 IN

## 2025-09-03 DIAGNOSIS — T70.0XXA OTITIC BAROTRAUMA, INITIAL ENCOUNTER: ICD-10-CM

## 2025-09-03 DIAGNOSIS — H65.01 ACUTE SEROUS OTITIS MEDIA, RIGHT EAR: ICD-10-CM

## 2025-09-03 DIAGNOSIS — J31.0 CHRONIC RHINITIS: ICD-10-CM

## 2025-09-03 DIAGNOSIS — H65.00 ACUTE SEROUS OTITIS MEDIA, UNSPECIFIED EAR: ICD-10-CM

## 2025-09-03 DIAGNOSIS — H91.21 SUDDEN IDIOPATHIC HEARING LOSS, RIGHT EAR: ICD-10-CM

## 2025-09-03 DIAGNOSIS — J34.2 DEVIATED NASAL SEPTUM: ICD-10-CM

## 2025-09-03 PROCEDURE — 92557 COMPREHENSIVE HEARING TEST: CPT

## 2025-09-03 PROCEDURE — 92567 TYMPANOMETRY: CPT

## 2025-09-03 PROCEDURE — 99214 OFFICE O/P EST MOD 30 MIN: CPT

## 2025-09-03 RX ORDER — FLUTICASONE PROPIONATE 50 UG/1
50 SPRAY NASAL
Qty: 48 | Refills: 3 | Status: ACTIVE | COMMUNITY
Start: 2025-09-03 | End: 1900-01-01

## 2025-09-03 RX ORDER — PREDNISONE 10 MG/1
10 TABLET ORAL 3 TIMES DAILY
Qty: 39 | Refills: 0 | Status: ACTIVE | COMMUNITY
Start: 2025-09-03 | End: 1900-01-01

## 2025-09-08 ENCOUNTER — APPOINTMENT (OUTPATIENT)
Dept: ORTHOPEDIC SURGERY | Facility: CLINIC | Age: 55
End: 2025-09-08
Payer: COMMERCIAL

## 2025-09-08 DIAGNOSIS — M23.92 UNSPECIFIED INTERNAL DERANGEMENT OF LEFT KNEE: ICD-10-CM

## 2025-09-08 DIAGNOSIS — M22.42 CHONDROMALACIA PATELLAE, LEFT KNEE: ICD-10-CM

## 2025-09-08 PROCEDURE — 99214 OFFICE O/P EST MOD 30 MIN: CPT

## 2025-09-08 PROCEDURE — 73562 X-RAY EXAM OF KNEE 3: CPT | Mod: LT

## 2025-09-08 RX ORDER — NAPROXEN 500 MG/1
500 TABLET ORAL
Qty: 20 | Refills: 1 | Status: ACTIVE | COMMUNITY
Start: 2025-09-08 | End: 1900-01-01

## 2025-09-17 ENCOUNTER — APPOINTMENT (OUTPATIENT)
Dept: OTOLARYNGOLOGY | Facility: CLINIC | Age: 55
End: 2025-09-17
Payer: COMMERCIAL

## 2025-09-17 VITALS
HEART RATE: 69 BPM | BODY MASS INDEX: 22.53 KG/M2 | HEIGHT: 64 IN | SYSTOLIC BLOOD PRESSURE: 120 MMHG | WEIGHT: 132 LBS | DIASTOLIC BLOOD PRESSURE: 84 MMHG

## 2025-09-17 DIAGNOSIS — J31.0 CHRONIC RHINITIS: ICD-10-CM

## 2025-09-17 DIAGNOSIS — H69.93 UNSPECIFIED EUSTACHIAN TUBE DISORDER, BILATERAL: ICD-10-CM

## 2025-09-17 DIAGNOSIS — H60.63 UNSPECIFIED CHRONIC OTITIS EXTERNA, BILATERAL: ICD-10-CM

## 2025-09-17 DIAGNOSIS — H93.8X1 OTHER SPECIFIED DISORDERS OF RIGHT EAR: ICD-10-CM

## 2025-09-17 DIAGNOSIS — J34.2 DEVIATED NASAL SEPTUM: ICD-10-CM

## 2025-09-17 PROCEDURE — 99213 OFFICE O/P EST LOW 20 MIN: CPT

## 2025-09-17 PROCEDURE — 92557 COMPREHENSIVE HEARING TEST: CPT

## 2025-09-17 PROCEDURE — 92567 TYMPANOMETRY: CPT

## 2025-09-17 RX ORDER — AZELASTINE HYDROCHLORIDE 137 UG/1
137 SPRAY, METERED NASAL TWICE DAILY
Qty: 1 | Refills: 5 | Status: ACTIVE | COMMUNITY
Start: 2025-09-17 | End: 1900-01-01

## 2025-09-18 ENCOUNTER — NON-APPOINTMENT (OUTPATIENT)
Age: 55
End: 2025-09-18